# Patient Record
Sex: FEMALE | Race: WHITE | NOT HISPANIC OR LATINO | Employment: OTHER | URBAN - METROPOLITAN AREA
[De-identification: names, ages, dates, MRNs, and addresses within clinical notes are randomized per-mention and may not be internally consistent; named-entity substitution may affect disease eponyms.]

---

## 2020-02-13 ENCOUNTER — HOSPITAL ENCOUNTER (EMERGENCY)
Facility: HOSPITAL | Age: 85
Discharge: HOME | End: 2020-02-13
Attending: EMERGENCY MEDICINE | Admitting: EMERGENCY MEDICINE
Payer: MEDICARE

## 2020-02-13 ENCOUNTER — APPOINTMENT (EMERGENCY)
Dept: RADIOLOGY | Facility: HOSPITAL | Age: 85
End: 2020-02-13
Payer: MEDICARE

## 2020-02-13 VITALS
DIASTOLIC BLOOD PRESSURE: 70 MMHG | OXYGEN SATURATION: 95 % | WEIGHT: 155 LBS | TEMPERATURE: 97.8 F | RESPIRATION RATE: 19 BRPM | HEART RATE: 88 BPM | SYSTOLIC BLOOD PRESSURE: 122 MMHG

## 2020-02-13 DIAGNOSIS — S01.511A LIP LACERATION, INITIAL ENCOUNTER: ICD-10-CM
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
## 2020-02-13 DIAGNOSIS — S09.90XA INJURY OF HEAD, INITIAL ENCOUNTER: Primary | ICD-10-CM

## 2020-02-13 PROCEDURE — 99283 EMERGENCY DEPT VISIT LOW MDM: CPT

## 2020-02-13 PROCEDURE — 12011 RPR F/E/E/N/L/M 2.5 CM/<: CPT | Performed by: PHYSICIAN ASSISTANT

## 2020-02-13 PROCEDURE — 99283 EMERGENCY DEPT VISIT LOW MDM: CPT | Performed by: PHYSICIAN ASSISTANT

## 2020-02-13 PROCEDURE — 70450 CT HEAD/BRAIN W/O DYE: CPT

## 2020-02-13 RX ORDER — LIDOCAINE HYDROCHLORIDE 10 MG/ML
5 INJECTION, SOLUTION EPIDURAL; INFILTRATION; INTRACAUDAL; PERINEURAL ONCE
Status: COMPLETED | OUTPATIENT
Start: 2020-02-13 | End: 2020-02-13

## 2020-02-13 RX ADMIN — LIDOCAINE HYDROCHLORIDE 5 ML: 10 INJECTION, SOLUTION EPIDURAL; INFILTRATION; INTRACAUDAL; PERINEURAL at 13:59

## 2020-02-13 NOTE — ED PROVIDER NOTES
History  Chief Complaint   Patient presents with   [address] [place] [address] emergency; slip/trip -headstrike -loc -thinners   c/o lip pain     Patient is an 81-year-old female presenting to the emergency department for evaluation of fall head injury  Patient states prior to arrival she was visiting a friend in the hospital, when she slipped, causing her to fall onto her face  Patient sustained laceration to lip and abrasion to right cheek  No loss of consciousness  Patient denies currently taking blood thinners  Per daughter, patient acting normally  Patient denies vision changes, vomiting, dizziness, headache, gait abnormality, tooth injury, eye pain, neck, back pain  None       History reviewed  No pertinent past medical history  History reviewed  No pertinent surgical history  History reviewed  No pertinent family history  I have reviewed and agree with the history as documented  Social History     Tobacco Use    Smoking status: Never Smoker    Smokeless tobacco: Never Used   Substance Use Topics    Alcohol use: Not Currently    Drug use: Not Currently       Review of Systems   Constitutional: Negative for chills and fever  HENT: Negative for ear pain and sore throat  Eyes: Negative for redness  Respiratory: Negative for chest tightness and shortness of breath  Cardiovascular: Negative for chest pain, palpitations and leg swelling  Gastrointestinal: Negative for abdominal pain, diarrhea, nausea and vomiting  Genitourinary: Negative for dysuria and hematuria  Musculoskeletal: Negative for back pain and neck pain  Head injury   Skin: Positive for wound  Neurological: Negative for weakness and headaches  Psychiatric/Behavioral: Negative for confusion  Physical Exam  Physical Exam   Constitutional: She is oriented to person, place, and time  She appears well-developed and well-nourished  Non-toxic appearance  She does not have a sickly appearance   She does not appear ill  HENT:   Head: Normocephalic and atraumatic  Right Ear: External ear normal    Left Ear: External ear normal    Nose: Nose normal  No nose lacerations, sinus tenderness or nasal deformity  No epistaxis  Mouth/Throat: Uvula is midline and mucous membranes are normal  Lacerations present  1cm laceration noted, crossing Vermillion border  No dental injury, no oral lacerations   Eyes: Pupils are equal, round, and reactive to light  Conjunctivae and EOM are normal    Neck: Normal range of motion  Neck supple  No spinous process tenderness and no muscular tenderness present  No neck rigidity  No edema, no erythema and normal range of motion present  Cardiovascular: Normal rate and regular rhythm  Pulmonary/Chest: Effort normal and breath sounds normal    Musculoskeletal: Normal range of motion  Neurological: She is alert and oriented to person, place, and time  Skin: Skin is warm and dry  Capillary refill takes less than 2 seconds  Psychiatric: She has a normal mood and affect  Her behavior is normal        Vital Signs  ED Triage Vitals [02/13/20 1345]   Temperature Pulse Respirations Blood Pressure SpO2   97 8 °F (36 6 °C) 88 19 122/70 95 %      Temp Source Heart Rate Source Patient Position - Orthostatic VS BP Location FiO2 (%)   Oral Monitor -- -- --      Pain Score       7           Vitals:    02/13/20 1345   BP: 122/70   Pulse: 88         Visual Acuity      ED Medications  Medications   lidocaine (PF) (XYLOCAINE-MPF) 1 % injection 5 mL (5 mL Infiltration Given by Other 2/13/20 1351)       Diagnostic Studies  Results Reviewed     None                 CT head without contrast   Final Result by Loreto Deal MD (02/13 7223)      No acute intracranial abnormality  Mild microangiopathic change              Workstation performed: [id number]                    Procedures  Laceration repair  Date/Time: 2/13/2020 2:36 PM  Performed by: Tal Saucedo PA-C  Authorized by: Tatiana Taylor Nacho Christianson PA-C   Consent: Verbal consent obtained  Risks and benefits: risks, benefits and alternatives were discussed  Consent given by: patient  Patient identity confirmed: verbally with patient  Time out: Immediately prior to procedure a "time out" was called to verify the correct patient, procedure, equipment, support staff and site/side marked as required  Body area: head/neck  Location details: upper lip  Full thickness lip laceration: no  Vermilion border involved: yes  Lip laceration height: vermilion only  Laceration length: 1 cm  Foreign bodies: no foreign bodies  Tendon involvement: none  Nerve involvement: none  Vascular damage: no  Anesthesia: local infiltration    Anesthesia:  Local Anesthetic: lidocaine 1% without epinephrine  Anesthetic total: 1 mL      Procedure Details:  Preparation: Patient was prepped and draped in the usual sterile fashion  Irrigation solution: saline  Irrigation method: syringe  Amount of cleaning: standard  Debridement: none  Degree of undermining: none  Skin closure: 5-0 Prolene  Number of sutures: 4  Technique: simple  Approximation: close  Approximation difficulty: simple  Lip approximation: vermillion border well aligned  Patient tolerance: Patient tolerated the procedure well with no immediate complications               ED Course           Identification of Seniors at Risk      Most Recent Value   (ISAR) Identification of Seniors at Risk   Before the illness or injury that brought you to the Emergency, did you need someone to help you on a regular basis? 0 Filed at: 02/13/2020 1347   In the last 24 hours, have you needed more help than usual?  0 Filed at: 02/13/2020 1347   Have you been hospitalized for one or more nights during the past 6 months? 0 Filed at: 02/13/2020 1347   In general, do you see well?  0 Filed at: 02/13/2020 1347   In general, do you have serious problems with your memory?   0 Filed at: 02/13/2020 1347   Do you take more than three different medications every day? 1 Filed at: 02/13/2020 1347   ISAR Score  1 Filed at: 02/13/2020 1347                          MDM  Number of Diagnoses or Management Options  Fall, initial encounter: new and does not require workup  Injury of head, initial encounter: new and does not require workup  Lip laceration, initial encounter: new and does not require workup  Diagnosis management comments: Patient is an 69-year-old female presenting to the emergency department for evaluation of fall head injury  Patient states prior to arrival she was visiting a friend in the hospital, when she slipped, causing her to fall onto her face  Patient sustained laceration to lip and abrasion to right cheek  No loss of consciousness  Patient denies currently taking blood thinners  Per daughter, patient acting normally  Patient denies vision changes, vomiting, dizziness, headache, gait abnormality, tooth injury, eye pain, neck, back pain  CT head shows "No acute intracranial abnormality  Mild microangiopathic change " Explained these results to patient  Abrasion noted to right cheek, cleaned  1cm laceration noted to upper lip involving Vermillion border  Laceration cleaned thoroughly, 4 sutures placed without complication, Vermillion border well approximated  Advised pt f/u with PCP for re-evaluation of head injury and suture removal in 5-7 days  Pt very well appearing, non-toxic, no focal neurological deficits and pt without any complaints at time of discharge  Pt verbalizes understanding and agrees with plan  The management plan was discussed in detail with the patient at bedside and all questions were answered  Prior to discharge, I provided both verbal and written instructions  I discussed with the patient the signs and symptoms for which to return to the emergency department  All questions were answered and patient was comfortable with the plan of care and discharged to home   The patient verbalized understanding of our discussion and plan of care, and agrees to return to the Emergency Department for concerns and progression of illness  Disposition  Final diagnoses:   Injury of head, initial encounter   Lip laceration, initial encounter   Fall, initial encounter     Time reflects when diagnosis was documented in both MDM as applicable and the Disposition within this note     Time User Action Codes Description Comment    2/13/2020  2:51 PM Corita Rein Add [S09 90XA] Injury of head, initial encounter     2/13/2020  2:51 PM Corita Rein Add [S01 511A] Lip laceration, initial encounter     2/13/2020  2:51 PM Corita Rein Add Josh Hernandez Erp, initial encounter       ED Disposition     ED Disposition Condition Date/Time Comment    Discharge Stable Thu Feb 13, 2020  2:51 PM Jefry Mcclure discharge to home/self care  Follow-up Information     Follow up With Specialties Details Why Contact Info    Shailesh Varela MD Family Medicine Go to  For suture removal in 5-7 days [address] [place] [address]  [phone]            Patient's Medications    No medications on file     No discharge procedures on file      PDMP Review     None          ED Provider  Electronically Signed by           Lito Espino PA-C  02/13/20 6694

## 2020-10-02 ENCOUNTER — HOSPITAL ENCOUNTER (OUTPATIENT)
Dept: RADIOLOGY | Facility: HOSPITAL | Age: 85
Discharge: HOME | End: 2020-10-02
Attending: FAMILY MEDICINE
Payer: COMMERCIAL

## 2020-10-02 ENCOUNTER — TRANSCRIBE ORDERS (OUTPATIENT)
Dept: ADMINISTRATIVE | Facility: HOSPITAL | Age: 85
End: 2020-10-02

## 2020-10-02 ENCOUNTER — APPOINTMENT (OUTPATIENT)
Dept: LAB | Facility: HOSPITAL | Age: 85
End: 2020-10-02
Attending: FAMILY MEDICINE
Payer: COMMERCIAL

## 2020-10-02 DIAGNOSIS — M25.571 RIGHT ANKLE PAIN, UNSPECIFIED CHRONICITY: ICD-10-CM

## 2020-10-02 DIAGNOSIS — M81.0 SENILE OSTEOPOROSIS: ICD-10-CM

## 2020-10-02 DIAGNOSIS — F03.90 SENILE DEMENTIA, UNCOMPLICATED (HCC): ICD-10-CM

## 2020-10-02 DIAGNOSIS — F03.90 SENILE DEMENTIA, UNCOMPLICATED (HCC): Primary | ICD-10-CM

## 2020-10-02 LAB
25(OH)D3 SERPL-MCNC: 24.9 NG/ML (ref 30–100)
ALBUMIN SERPL BCP-MCNC: 4.1 G/DL (ref 3.4–4.8)
ALP SERPL-CCNC: 55.5 U/L (ref 35–140)
ALT SERPL W P-5'-P-CCNC: 15 U/L (ref 5–54)
ANION GAP SERPL CALCULATED.3IONS-SCNC: 8 MMOL/L (ref 4–13)
AST SERPL W P-5'-P-CCNC: 21 U/L (ref 15–41)
BASOPHILS # BLD AUTO: 0.1 THOUSANDS/ΜL (ref 0–0.1)
BASOPHILS NFR BLD AUTO: 2 % (ref 0–1)
BILIRUB SERPL-MCNC: 0.86 MG/DL (ref 0.3–1.2)
BUN SERPL-MCNC: 23 MG/DL (ref 6–20)
CALCIUM SERPL-MCNC: 9.1 MG/DL (ref 8.4–10.2)
CHLORIDE SERPL-SCNC: 110 MMOL/L (ref 96–108)
CHOLEST SERPL-MCNC: 118 MG/DL
CO2 SERPL-SCNC: 26 MMOL/L (ref 22–33)
CREAT SERPL-MCNC: 0.58 MG/DL (ref 0.4–1.1)
EOSINOPHIL # BLD AUTO: 0.25 THOUSAND/ΜL (ref 0–0.61)
EOSINOPHIL NFR BLD AUTO: 4 % (ref 0–6)
ERYTHROCYTE [DISTWIDTH] IN BLOOD BY AUTOMATED COUNT: 13.7 % (ref 11.6–15.1)
GFR SERPL CREATININE-BSD FRML MDRD: 85 ML/MIN/1.73SQ M
GLUCOSE P FAST SERPL-MCNC: 105 MG/DL (ref 70–100)
HCT VFR BLD AUTO: 41.2 % (ref 34.8–46.1)
HDLC SERPL-MCNC: 38 MG/DL
HGB BLD-MCNC: 13.9 G/DL (ref 11.5–15.4)
IMM GRANULOCYTES # BLD AUTO: 0.02 THOUSAND/UL (ref 0–0.2)
IMM GRANULOCYTES NFR BLD AUTO: 0 % (ref 0–2)
LDLC SERPL CALC-MCNC: 64 MG/DL (ref 0–100)
LYMPHOCYTES # BLD AUTO: 1.27 THOUSANDS/ΜL (ref 0.6–4.47)
LYMPHOCYTES NFR BLD AUTO: 20 % (ref 14–44)
MCH RBC QN AUTO: 31.4 PG (ref 26.8–34.3)
MCHC RBC AUTO-ENTMCNC: 33.7 G/DL (ref 31.4–37.4)
MCV RBC AUTO: 93 FL (ref 82–98)
MONOCYTES # BLD AUTO: 0.51 THOUSAND/ΜL (ref 0.17–1.22)
MONOCYTES NFR BLD AUTO: 8 % (ref 4–12)
NEUTROPHILS # BLD AUTO: 4.07 THOUSANDS/ΜL (ref 1.85–7.62)
NEUTS SEG NFR BLD AUTO: 66 % (ref 43–75)
NONHDLC SERPL-MCNC: 80 MG/DL
PLATELET # BLD AUTO: 199 THOUSANDS/UL (ref 149–390)
PMV BLD AUTO: 12.7 FL (ref 8.9–12.7)
POTASSIUM SERPL-SCNC: 3.9 MMOL/L (ref 3.5–5)
PROT SERPL-MCNC: 7 G/DL (ref 6.4–8.3)
RBC # BLD AUTO: 4.43 MILLION/UL (ref 3.81–5.12)
SODIUM SERPL-SCNC: 144 MMOL/L (ref 133–145)
TRIGL SERPL-MCNC: 80.2 MG/DL
TSH SERPL DL<=0.05 MIU/L-ACNC: 4.88 UIU/ML (ref 0.34–5.6)
WBC # BLD AUTO: 6.22 THOUSAND/UL (ref 4.31–10.16)

## 2020-10-02 PROCEDURE — 80061 LIPID PANEL: CPT

## 2020-10-02 PROCEDURE — 36415 COLL VENOUS BLD VENIPUNCTURE: CPT

## 2020-10-02 PROCEDURE — 80053 COMPREHEN METABOLIC PANEL: CPT

## 2020-10-02 PROCEDURE — 82306 VITAMIN D 25 HYDROXY: CPT

## 2020-10-02 PROCEDURE — 85025 COMPLETE CBC W/AUTO DIFF WBC: CPT

## 2020-10-02 PROCEDURE — 84443 ASSAY THYROID STIM HORMONE: CPT

## 2020-10-02 PROCEDURE — 73610 X-RAY EXAM OF ANKLE: CPT

## 2021-03-02 DIAGNOSIS — Z23 ENCOUNTER FOR IMMUNIZATION: ICD-10-CM

## 2021-03-10 ENCOUNTER — IMMUNIZATIONS (OUTPATIENT)
Dept: FAMILY MEDICINE CLINIC | Facility: HOSPITAL | Age: 86
End: 2021-03-10

## 2021-03-10 DIAGNOSIS — Z23 ENCOUNTER FOR IMMUNIZATION: Primary | ICD-10-CM

## 2021-03-10 PROCEDURE — 0001A SARS-COV-2 / COVID-19 MRNA VACCINE (PFIZER-BIONTECH) 30 MCG: CPT

## 2021-03-10 PROCEDURE — 91300 SARS-COV-2 / COVID-19 MRNA VACCINE (PFIZER-BIONTECH) 30 MCG: CPT

## 2021-03-25 ENCOUNTER — APPOINTMENT (OUTPATIENT)
Dept: LAB | Facility: HOSPITAL | Age: 86
End: 2021-03-25
Attending: FAMILY MEDICINE
Payer: MEDICARE

## 2021-03-25 ENCOUNTER — TRANSCRIBE ORDERS (OUTPATIENT)
Dept: ADMINISTRATIVE | Facility: HOSPITAL | Age: 86
End: 2021-03-25

## 2021-03-25 DIAGNOSIS — R73.01 IMPAIRED FASTING GLUCOSE: Primary | ICD-10-CM

## 2021-03-25 DIAGNOSIS — E55.9 VITAMIN D DEFICIENCY: ICD-10-CM

## 2021-03-25 DIAGNOSIS — R73.01 IMPAIRED FASTING GLUCOSE: ICD-10-CM

## 2021-03-25 DIAGNOSIS — M81.0 OSTEOPOROSIS, POST-MENOPAUSAL: Primary | ICD-10-CM

## 2021-03-25 LAB
25(OH)D3 SERPL-MCNC: 22.3 NG/ML (ref 30–100)
ALBUMIN SERPL BCP-MCNC: 4.2 G/DL (ref 3.4–4.8)
ALP SERPL-CCNC: 63 U/L (ref 35–140)
ALT SERPL W P-5'-P-CCNC: 17 U/L (ref 5–54)
ANION GAP SERPL CALCULATED.3IONS-SCNC: 9 MMOL/L (ref 4–13)
AST SERPL W P-5'-P-CCNC: 25 U/L (ref 15–41)
BASOPHILS # BLD AUTO: 0.07 THOUSANDS/ΜL (ref 0–0.1)
BASOPHILS NFR BLD AUTO: 1 % (ref 0–1)
BILIRUB SERPL-MCNC: 0.86 MG/DL (ref 0.3–1.2)
BUN SERPL-MCNC: 16 MG/DL (ref 6–20)
CALCIUM SERPL-MCNC: 9 MG/DL (ref 8.4–10.2)
CHLORIDE SERPL-SCNC: 108 MMOL/L (ref 96–108)
CO2 SERPL-SCNC: 24 MMOL/L (ref 22–33)
CREAT SERPL-MCNC: 0.67 MG/DL (ref 0.4–1.1)
EOSINOPHIL # BLD AUTO: 0.15 THOUSAND/ΜL (ref 0–0.61)
EOSINOPHIL NFR BLD AUTO: 2 % (ref 0–6)
ERYTHROCYTE [DISTWIDTH] IN BLOOD BY AUTOMATED COUNT: 13.8 % (ref 11.6–15.1)
EST. AVERAGE GLUCOSE BLD GHB EST-MCNC: 111 MG/DL
GFR SERPL CREATININE-BSD FRML MDRD: 81 ML/MIN/1.73SQ M
GLUCOSE P FAST SERPL-MCNC: 114 MG/DL (ref 70–105)
HBA1C MFR BLD: 5.5 %
HCT VFR BLD AUTO: 43.1 % (ref 34.8–46.1)
HGB BLD-MCNC: 14.4 G/DL (ref 11.5–15.4)
IMM GRANULOCYTES # BLD AUTO: 0.02 THOUSAND/UL (ref 0–0.2)
IMM GRANULOCYTES NFR BLD AUTO: 0 % (ref 0–2)
LYMPHOCYTES # BLD AUTO: 1.2 THOUSANDS/ΜL (ref 0.6–4.47)
LYMPHOCYTES NFR BLD AUTO: 18 % (ref 14–44)
MCH RBC QN AUTO: 30.9 PG (ref 26.8–34.3)
MCHC RBC AUTO-ENTMCNC: 33.4 G/DL (ref 31.4–37.4)
MCV RBC AUTO: 93 FL (ref 82–98)
MONOCYTES # BLD AUTO: 0.73 THOUSAND/ΜL (ref 0.17–1.22)
MONOCYTES NFR BLD AUTO: 11 % (ref 4–12)
NEUTROPHILS # BLD AUTO: 4.42 THOUSANDS/ΜL (ref 1.85–7.62)
NEUTS SEG NFR BLD AUTO: 68 % (ref 43–75)
PLATELET # BLD AUTO: 170 THOUSANDS/UL (ref 149–390)
PMV BLD AUTO: 12.1 FL (ref 8.9–12.7)
POTASSIUM SERPL-SCNC: 4 MMOL/L (ref 3.5–5)
PROT SERPL-MCNC: 7 G/DL (ref 6.4–8.3)
RBC # BLD AUTO: 4.66 MILLION/UL (ref 3.81–5.12)
SODIUM SERPL-SCNC: 141 MMOL/L (ref 133–145)
WBC # BLD AUTO: 6.59 THOUSAND/UL (ref 4.31–10.16)

## 2021-03-25 PROCEDURE — 82306 VITAMIN D 25 HYDROXY: CPT

## 2021-03-25 PROCEDURE — 36415 COLL VENOUS BLD VENIPUNCTURE: CPT

## 2021-03-25 PROCEDURE — 80053 COMPREHEN METABOLIC PANEL: CPT

## 2021-03-25 PROCEDURE — 85025 COMPLETE CBC W/AUTO DIFF WBC: CPT

## 2021-03-25 PROCEDURE — 83036 HEMOGLOBIN GLYCOSYLATED A1C: CPT

## 2021-03-29 ENCOUNTER — HOSPITAL ENCOUNTER (OUTPATIENT)
Dept: RADIOLOGY | Facility: HOSPITAL | Age: 86
Discharge: HOME | End: 2021-03-29
Payer: MEDICARE

## 2021-03-29 DIAGNOSIS — M81.0 OSTEOPOROSIS, POST-MENOPAUSAL: ICD-10-CM

## 2021-03-29 PROCEDURE — 77080 DXA BONE DENSITY AXIAL: CPT

## 2021-04-11 ENCOUNTER — IMMUNIZATIONS (OUTPATIENT)
Dept: FAMILY MEDICINE CLINIC | Facility: HOSPITAL | Age: 86
End: 2021-04-11

## 2021-04-11 DIAGNOSIS — Z23 ENCOUNTER FOR IMMUNIZATION: Primary | ICD-10-CM

## 2021-04-11 PROCEDURE — 0002A SARS-COV-2 / COVID-19 MRNA VACCINE (PFIZER-BIONTECH) 30 MCG: CPT

## 2021-04-11 PROCEDURE — 91300 SARS-COV-2 / COVID-19 MRNA VACCINE (PFIZER-BIONTECH) 30 MCG: CPT

## 2021-11-16 ENCOUNTER — IMMUNIZATIONS (OUTPATIENT)
Dept: FAMILY MEDICINE CLINIC | Facility: HOSPITAL | Age: 86
End: 2021-11-16

## 2021-11-16 DIAGNOSIS — Z23 ENCOUNTER FOR IMMUNIZATION: Primary | ICD-10-CM

## 2021-11-16 PROCEDURE — 91300 COVID-19 PFIZER VACC 0.3 ML: CPT

## 2021-11-16 PROCEDURE — 0001A COVID-19 PFIZER VACC 0.3 ML: CPT

## 2022-03-28 ENCOUNTER — APPOINTMENT (OUTPATIENT)
Dept: LAB | Facility: HOSPITAL | Age: 87
End: 2022-03-28
Attending: FAMILY MEDICINE
Payer: COMMERCIAL

## 2022-03-28 DIAGNOSIS — E55.9 VITAMIN D DEFICIENCY: ICD-10-CM

## 2022-03-28 DIAGNOSIS — R73.01 IMPAIRED FASTING GLUCOSE: ICD-10-CM

## 2022-03-28 LAB
25(OH)D3 SERPL-MCNC: 29.9 NG/ML (ref 30–100)
ALBUMIN SERPL BCP-MCNC: 4.5 G/DL (ref 3.5–5)
ALP SERPL-CCNC: 65 U/L (ref 34–104)
ALT SERPL W P-5'-P-CCNC: 19 U/L (ref 7–52)
ANION GAP SERPL CALCULATED.3IONS-SCNC: 9 MMOL/L (ref 4–13)
AST SERPL W P-5'-P-CCNC: 25 U/L (ref 13–39)
BASOPHILS # BLD AUTO: 0.09 THOUSANDS/ΜL (ref 0–0.1)
BASOPHILS NFR BLD AUTO: 1 % (ref 0–1)
BILIRUB SERPL-MCNC: 0.9 MG/DL (ref 0.2–1)
BUN SERPL-MCNC: 21 MG/DL (ref 5–25)
CALCIUM SERPL-MCNC: 9.5 MG/DL (ref 8.4–10.2)
CHLORIDE SERPL-SCNC: 105 MMOL/L (ref 96–108)
CHOLEST SERPL-MCNC: 152 MG/DL
CO2 SERPL-SCNC: 25 MMOL/L (ref 21–32)
CREAT SERPL-MCNC: 0.71 MG/DL (ref 0.6–1.3)
EOSINOPHIL # BLD AUTO: 0.11 THOUSAND/ΜL (ref 0–0.61)
EOSINOPHIL NFR BLD AUTO: 1 % (ref 0–6)
ERYTHROCYTE [DISTWIDTH] IN BLOOD BY AUTOMATED COUNT: 13.4 % (ref 11.6–15.1)
EST. AVERAGE GLUCOSE BLD GHB EST-MCNC: 114 MG/DL
GFR SERPL CREATININE-BSD FRML MDRD: 77 ML/MIN/1.73SQ M
GLUCOSE P FAST SERPL-MCNC: 108 MG/DL (ref 65–99)
HBA1C MFR BLD: 5.6 %
HCT VFR BLD AUTO: 44.4 % (ref 34.8–46.1)
HDLC SERPL-MCNC: 50 MG/DL
HGB BLD-MCNC: 14.8 G/DL (ref 11.5–15.4)
IMM GRANULOCYTES # BLD AUTO: 0.05 THOUSAND/UL (ref 0–0.2)
IMM GRANULOCYTES NFR BLD AUTO: 1 % (ref 0–2)
LDLC SERPL CALC-MCNC: 81 MG/DL (ref 0–100)
LYMPHOCYTES # BLD AUTO: 1.55 THOUSANDS/ΜL (ref 0.6–4.47)
LYMPHOCYTES NFR BLD AUTO: 19 % (ref 14–44)
MCH RBC QN AUTO: 30.9 PG (ref 26.8–34.3)
MCHC RBC AUTO-ENTMCNC: 33.3 G/DL (ref 31.4–37.4)
MCV RBC AUTO: 93 FL (ref 82–98)
MONOCYTES # BLD AUTO: 0.67 THOUSAND/ΜL (ref 0.17–1.22)
MONOCYTES NFR BLD AUTO: 8 % (ref 4–12)
NEUTROPHILS # BLD AUTO: 5.52 THOUSANDS/ΜL (ref 1.85–7.62)
NEUTS SEG NFR BLD AUTO: 70 % (ref 43–75)
NONHDLC SERPL-MCNC: 102 MG/DL
NRBC BLD AUTO-RTO: 0 /100 WBCS
PLATELET # BLD AUTO: 199 THOUSANDS/UL (ref 149–390)
PMV BLD AUTO: 12.4 FL (ref 8.9–12.7)
POTASSIUM SERPL-SCNC: 4.2 MMOL/L (ref 3.5–5.3)
PROT SERPL-MCNC: 7.5 G/DL (ref 6.4–8.4)
RBC # BLD AUTO: 4.79 MILLION/UL (ref 3.81–5.12)
SODIUM SERPL-SCNC: 139 MMOL/L (ref 135–147)
TRIGL SERPL-MCNC: 106 MG/DL
WBC # BLD AUTO: 7.99 THOUSAND/UL (ref 4.31–10.16)

## 2022-03-28 PROCEDURE — 80053 COMPREHEN METABOLIC PANEL: CPT

## 2022-03-28 PROCEDURE — 80061 LIPID PANEL: CPT

## 2022-03-28 PROCEDURE — 36415 COLL VENOUS BLD VENIPUNCTURE: CPT

## 2022-03-28 PROCEDURE — 82306 VITAMIN D 25 HYDROXY: CPT

## 2022-03-28 PROCEDURE — 83036 HEMOGLOBIN GLYCOSYLATED A1C: CPT

## 2022-03-28 PROCEDURE — 85025 COMPLETE CBC W/AUTO DIFF WBC: CPT

## 2022-10-05 ENCOUNTER — APPOINTMENT (OUTPATIENT)
Dept: LAB | Facility: HOSPITAL | Age: 87
End: 2022-10-05
Attending: FAMILY MEDICINE
Payer: MEDICARE

## 2022-10-05 DIAGNOSIS — R73.01 IMPAIRED FASTING GLUCOSE: ICD-10-CM

## 2022-10-05 DIAGNOSIS — E55.9 AVITAMINOSIS D: ICD-10-CM

## 2022-10-05 DIAGNOSIS — E03.9 ACQUIRED HYPOTHYROIDISM: Primary | ICD-10-CM

## 2022-10-05 LAB
25(OH)D3 SERPL-MCNC: 59.5 NG/ML (ref 30–100)
ALBUMIN SERPL BCP-MCNC: 4.5 G/DL (ref 3.5–5)
ALP SERPL-CCNC: 79 U/L (ref 34–104)
ALT SERPL W P-5'-P-CCNC: 19 U/L (ref 7–52)
ANION GAP SERPL CALCULATED.3IONS-SCNC: 12 MMOL/L (ref 4–13)
AST SERPL W P-5'-P-CCNC: 28 U/L (ref 13–39)
BASOPHILS # BLD AUTO: 0.09 THOUSANDS/ΜL (ref 0–0.1)
BASOPHILS NFR BLD AUTO: 1 % (ref 0–1)
BILIRUB SERPL-MCNC: 0.78 MG/DL (ref 0.2–1)
BUN SERPL-MCNC: 18 MG/DL (ref 5–25)
CALCIUM SERPL-MCNC: 9.5 MG/DL (ref 8.4–10.2)
CHLORIDE SERPL-SCNC: 107 MMOL/L (ref 96–108)
CHOLEST SERPL-MCNC: 152 MG/DL
CO2 SERPL-SCNC: 23 MMOL/L (ref 21–32)
CREAT SERPL-MCNC: 0.64 MG/DL (ref 0.6–1.3)
EOSINOPHIL # BLD AUTO: 0.1 THOUSAND/ΜL (ref 0–0.61)
EOSINOPHIL NFR BLD AUTO: 1 % (ref 0–6)
ERYTHROCYTE [DISTWIDTH] IN BLOOD BY AUTOMATED COUNT: 13.6 % (ref 11.6–15.1)
GFR SERPL CREATININE-BSD FRML MDRD: 81 ML/MIN/1.73SQ M
GLUCOSE P FAST SERPL-MCNC: 110 MG/DL (ref 65–99)
HCT VFR BLD AUTO: 44.1 % (ref 34.8–46.1)
HDLC SERPL-MCNC: 45 MG/DL
HGB BLD-MCNC: 14.8 G/DL (ref 11.5–15.4)
IMM GRANULOCYTES # BLD AUTO: 0.05 THOUSAND/UL (ref 0–0.2)
IMM GRANULOCYTES NFR BLD AUTO: 1 % (ref 0–2)
LDLC SERPL CALC-MCNC: 77 MG/DL (ref 0–100)
LYMPHOCYTES # BLD AUTO: 1.57 THOUSANDS/ΜL (ref 0.6–4.47)
LYMPHOCYTES NFR BLD AUTO: 18 % (ref 14–44)
MCH RBC QN AUTO: 31.4 PG (ref 26.8–34.3)
MCHC RBC AUTO-ENTMCNC: 33.6 G/DL (ref 31.4–37.4)
MCV RBC AUTO: 93 FL (ref 82–98)
MONOCYTES # BLD AUTO: 0.66 THOUSAND/ΜL (ref 0.17–1.22)
MONOCYTES NFR BLD AUTO: 8 % (ref 4–12)
NEUTROPHILS # BLD AUTO: 6.35 THOUSANDS/ΜL (ref 1.85–7.62)
NEUTS SEG NFR BLD AUTO: 71 % (ref 43–75)
NONHDLC SERPL-MCNC: 107 MG/DL
NRBC BLD AUTO-RTO: 0 /100 WBCS
PLATELET # BLD AUTO: 216 THOUSANDS/UL (ref 149–390)
PMV BLD AUTO: 12.5 FL (ref 8.9–12.7)
POTASSIUM SERPL-SCNC: 4.1 MMOL/L (ref 3.5–5.3)
PROT SERPL-MCNC: 7.6 G/DL (ref 6.4–8.4)
RBC # BLD AUTO: 4.72 MILLION/UL (ref 3.81–5.12)
SODIUM SERPL-SCNC: 142 MMOL/L (ref 135–147)
TRIGL SERPL-MCNC: 148 MG/DL
TSH SERPL DL<=0.05 MIU/L-ACNC: 5.46 UIU/ML (ref 0.45–4.5)
WBC # BLD AUTO: 8.82 THOUSAND/UL (ref 4.31–10.16)

## 2022-10-05 PROCEDURE — 83036 HEMOGLOBIN GLYCOSYLATED A1C: CPT

## 2022-10-05 PROCEDURE — 80053 COMPREHEN METABOLIC PANEL: CPT

## 2022-10-05 PROCEDURE — 82306 VITAMIN D 25 HYDROXY: CPT

## 2022-10-05 PROCEDURE — 84443 ASSAY THYROID STIM HORMONE: CPT

## 2022-10-05 PROCEDURE — 36415 COLL VENOUS BLD VENIPUNCTURE: CPT

## 2022-10-05 PROCEDURE — 85025 COMPLETE CBC W/AUTO DIFF WBC: CPT

## 2022-10-05 PROCEDURE — 84439 ASSAY OF FREE THYROXINE: CPT

## 2022-10-05 PROCEDURE — 80061 LIPID PANEL: CPT

## 2022-10-06 LAB
EST. AVERAGE GLUCOSE BLD GHB EST-MCNC: 117 MG/DL
HBA1C MFR BLD: 5.7 %
T4 FREE SERPL-MCNC: 0.8 NG/DL (ref 0.76–1.46)

## 2024-01-31 ENCOUNTER — APPOINTMENT (EMERGENCY)
Dept: RADIOLOGY | Facility: HOSPITAL | Age: 89
DRG: 481 | End: 2024-01-31
Payer: MEDICARE

## 2024-01-31 ENCOUNTER — APPOINTMENT (EMERGENCY)
Dept: CT IMAGING | Facility: HOSPITAL | Age: 89
DRG: 481 | End: 2024-01-31
Payer: MEDICARE

## 2024-01-31 ENCOUNTER — HOSPITAL ENCOUNTER (INPATIENT)
Facility: HOSPITAL | Age: 89
LOS: 4 days | Discharge: NURSING, REHAB OR LONG-TERM CARE | DRG: 481 | End: 2024-02-04
Attending: EMERGENCY MEDICINE | Admitting: SURGERY
Payer: MEDICARE

## 2024-01-31 DIAGNOSIS — F03.90 DEMENTIA (HCC): ICD-10-CM

## 2024-01-31 DIAGNOSIS — M97.01XA PERIPROSTHETIC FRACTURE AROUND INTERNAL PROSTHETIC RIGHT HIP JOINT, INITIAL ENCOUNTER (HCC): Primary | ICD-10-CM

## 2024-01-31 DIAGNOSIS — M19.90 DEGENERATIVE ARTHRITIS: ICD-10-CM
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
## 2024-01-31 PROBLEM — G89.11 ACUTE PAIN DUE TO TRAUMA: Status: ACTIVE | Noted: 2024-01-31

## 2024-01-31 PROBLEM — Z96.649 PERIPROSTHETIC FRACTURE AROUND INTERNAL PROSTHETIC HIP JOINT: Status: ACTIVE | Noted: 2024-01-31

## 2024-01-31 PROBLEM — Z86.39 HISTORY OF HYPOTHYROIDISM: Status: ACTIVE | Noted: 2024-01-31

## 2024-01-31 PROBLEM — M97.8XXA PERIPROSTHETIC FRACTURE AROUND INTERNAL PROSTHETIC HIP JOINT: Status: ACTIVE | Noted: 2024-01-31

## 2024-01-31 PROBLEM — Z86.59 HISTORY OF DEMENTIA: Status: ACTIVE | Noted: 2024-01-31

## 2024-01-31 LAB
ABO GROUP BLD: NORMAL
ABO GROUP BLD: NORMAL
AMORPH URATE CRY URNS QL MICRO: ABNORMAL
ANION GAP SERPL CALCULATED.3IONS-SCNC: 8 MMOL/L
BACTERIA UR QL AUTO: ABNORMAL /HPF
BASOPHILS # BLD AUTO: 0.08 THOUSANDS/ÂΜL (ref 0–0.1)
BASOPHILS NFR BLD AUTO: 1 % (ref 0–1)
BILIRUB UR QL STRIP: NEGATIVE
BLD GP AB SCN SERPL QL: NEGATIVE
BUN SERPL-MCNC: 13 MG/DL (ref 5–25)
CALCIUM SERPL-MCNC: 9 MG/DL (ref 8.4–10.2)
CHLORIDE SERPL-SCNC: 106 MMOL/L (ref 96–108)
CLARITY UR: ABNORMAL
CO2 SERPL-SCNC: 25 MMOL/L (ref 21–32)
COLOR UR: YELLOW
CREAT SERPL-MCNC: 0.59 MG/DL (ref 0.6–1.3)
EOSINOPHIL # BLD AUTO: 0.07 THOUSAND/ÂΜL (ref 0–0.61)
EOSINOPHIL NFR BLD AUTO: 1 % (ref 0–6)
ERYTHROCYTE [DISTWIDTH] IN BLOOD BY AUTOMATED COUNT: 13.9 % (ref 11.6–15.1)
GFR SERPL CREATININE-BSD FRML MDRD: 82 ML/MIN/1.73SQ M
GLUCOSE SERPL-MCNC: 109 MG/DL (ref 65–140)
GLUCOSE UR STRIP-MCNC: NEGATIVE MG/DL
HCT VFR BLD AUTO: 37.7 % (ref 34.8–46.1)
HGB BLD-MCNC: 12.4 G/DL (ref 11.5–15.4)
HGB UR QL STRIP.AUTO: NEGATIVE
IMM GRANULOCYTES # BLD AUTO: 0.04 THOUSAND/UL (ref 0–0.2)
IMM GRANULOCYTES NFR BLD AUTO: 0 % (ref 0–2)
INR PPP: 1.09 (ref 0.84–1.19)
KETONES UR STRIP-MCNC: NEGATIVE MG/DL
LEUKOCYTE ESTERASE UR QL STRIP: ABNORMAL
LYMPHOCYTES # BLD AUTO: 1.35 THOUSANDS/ÂΜL (ref 0.6–4.47)
LYMPHOCYTES NFR BLD AUTO: 13 % (ref 14–44)
MAGNESIUM SERPL-MCNC: 2.2 MG/DL (ref 1.9–2.7)
MCH RBC QN AUTO: 30.9 PG (ref 26.8–34.3)
MCHC RBC AUTO-ENTMCNC: 32.9 G/DL (ref 31.4–37.4)
MCV RBC AUTO: 94 FL (ref 82–98)
MONOCYTES # BLD AUTO: 1.07 THOUSAND/ÂΜL (ref 0.17–1.22)
MONOCYTES NFR BLD AUTO: 10 % (ref 4–12)
NEUTROPHILS # BLD AUTO: 7.95 THOUSANDS/ÂΜL (ref 1.85–7.62)
NEUTS SEG NFR BLD AUTO: 75 % (ref 43–75)
NITRITE UR QL STRIP: POSITIVE
NON-SQ EPI CELLS URNS QL MICRO: ABNORMAL /HPF
NRBC BLD AUTO-RTO: 0 /100 WBCS
PH UR STRIP.AUTO: 6.5 [PH]
PHOSPHATE SERPL-MCNC: 4 MG/DL (ref 2.3–4.1)
PLATELET # BLD AUTO: 172 THOUSANDS/UL (ref 149–390)
PMV BLD AUTO: 11.6 FL (ref 8.9–12.7)
POTASSIUM SERPL-SCNC: 3.9 MMOL/L (ref 3.5–5.3)
PROT UR STRIP-MCNC: NEGATIVE MG/DL
PROTHROMBIN TIME: 14.8 SECONDS (ref 11.6–14.5)
RBC # BLD AUTO: 4.01 MILLION/UL (ref 3.81–5.12)
RBC #/AREA URNS AUTO: ABNORMAL /HPF
RH BLD: POSITIVE
RH BLD: POSITIVE
SODIUM SERPL-SCNC: 139 MMOL/L (ref 135–147)
SP GR UR STRIP.AUTO: 1.01 (ref 1–1.03)
SPECIMEN EXPIRATION DATE: NORMAL
UROBILINOGEN UR STRIP-ACNC: <2 MG/DL
WBC # BLD AUTO: 10.56 THOUSAND/UL (ref 4.31–10.16)
WBC #/AREA URNS AUTO: ABNORMAL /HPF

## 2024-01-31 PROCEDURE — 86901 BLOOD TYPING SEROLOGIC RH(D): CPT | Performed by: PHYSICIAN ASSISTANT

## 2024-01-31 PROCEDURE — 72125 CT NECK SPINE W/O DYE: CPT

## 2024-01-31 PROCEDURE — 72170 X-RAY EXAM OF PELVIS: CPT

## 2024-01-31 PROCEDURE — 99223 1ST HOSP IP/OBS HIGH 75: CPT | Performed by: STUDENT IN AN ORGANIZED HEALTH CARE EDUCATION/TRAINING PROGRAM

## 2024-01-31 PROCEDURE — 99285 EMERGENCY DEPT VISIT HI MDM: CPT | Performed by: PHYSICIAN ASSISTANT

## 2024-01-31 PROCEDURE — 86850 RBC ANTIBODY SCREEN: CPT | Performed by: PHYSICIAN ASSISTANT

## 2024-01-31 PROCEDURE — 73552 X-RAY EXAM OF FEMUR 2/>: CPT

## 2024-01-31 PROCEDURE — 85610 PROTHROMBIN TIME: CPT | Performed by: PHYSICIAN ASSISTANT

## 2024-01-31 PROCEDURE — 99223 1ST HOSP IP/OBS HIGH 75: CPT | Performed by: SURGERY

## 2024-01-31 PROCEDURE — 81001 URINALYSIS AUTO W/SCOPE: CPT | Performed by: PHYSICIAN ASSISTANT

## 2024-01-31 PROCEDURE — 70450 CT HEAD/BRAIN W/O DYE: CPT

## 2024-01-31 PROCEDURE — 80048 BASIC METABOLIC PNL TOTAL CA: CPT | Performed by: PHYSICIAN ASSISTANT

## 2024-01-31 PROCEDURE — 83735 ASSAY OF MAGNESIUM: CPT | Performed by: PHYSICIAN ASSISTANT

## 2024-01-31 PROCEDURE — 86900 BLOOD TYPING SEROLOGIC ABO: CPT | Performed by: PHYSICIAN ASSISTANT

## 2024-01-31 PROCEDURE — 73700 CT LOWER EXTREMITY W/O DYE: CPT

## 2024-01-31 PROCEDURE — 84100 ASSAY OF PHOSPHORUS: CPT | Performed by: PHYSICIAN ASSISTANT

## 2024-01-31 PROCEDURE — 85025 COMPLETE CBC W/AUTO DIFF WBC: CPT | Performed by: PHYSICIAN ASSISTANT

## 2024-01-31 PROCEDURE — 99285 EMERGENCY DEPT VISIT HI MDM: CPT

## 2024-01-31 PROCEDURE — 36415 COLL VENOUS BLD VENIPUNCTURE: CPT | Performed by: PHYSICIAN ASSISTANT

## 2024-01-31 PROCEDURE — G1004 CDSM NDSC: HCPCS

## 2024-01-31 PROCEDURE — 71045 X-RAY EXAM CHEST 1 VIEW: CPT

## 2024-01-31 RX ORDER — POLYETHYLENE GLYCOL 3350 17 G/17G
17 POWDER, FOR SOLUTION ORAL DAILY
Status: DISCONTINUED | OUTPATIENT
Start: 2024-01-31 | End: 2024-02-04 | Stop reason: HOSPADM

## 2024-01-31 RX ORDER — ENOXAPARIN SODIUM 100 MG/ML
30 INJECTION SUBCUTANEOUS EVERY 12 HOURS SCHEDULED
Status: DISCONTINUED | OUTPATIENT
Start: 2024-01-31 | End: 2024-02-04 | Stop reason: HOSPADM

## 2024-01-31 RX ORDER — OXYCODONE HYDROCHLORIDE 5 MG/1
5 TABLET ORAL EVERY 4 HOURS PRN
Status: DISCONTINUED | OUTPATIENT
Start: 2024-01-31 | End: 2024-02-04 | Stop reason: HOSPADM

## 2024-01-31 RX ORDER — HYDROMORPHONE HCL IN WATER/PF 6 MG/30 ML
0.2 PATIENT CONTROLLED ANALGESIA SYRINGE INTRAVENOUS EVERY 2 HOUR PRN
Status: DISCONTINUED | OUTPATIENT
Start: 2024-01-31 | End: 2024-02-04

## 2024-01-31 RX ORDER — DONEPEZIL HYDROCHLORIDE 5 MG/1
10 TABLET, FILM COATED ORAL
Status: DISCONTINUED | OUTPATIENT
Start: 2024-02-01 | End: 2024-02-04 | Stop reason: HOSPADM

## 2024-01-31 RX ORDER — LEVOTHYROXINE SODIUM 0.03 MG/1
25 TABLET ORAL
Status: DISCONTINUED | OUTPATIENT
Start: 2024-02-01 | End: 2024-02-04 | Stop reason: HOSPADM

## 2024-01-31 RX ORDER — GABAPENTIN 100 MG/1
100 CAPSULE ORAL
Status: DISCONTINUED | OUTPATIENT
Start: 2024-01-31 | End: 2024-02-04 | Stop reason: HOSPADM

## 2024-01-31 RX ORDER — ACETAMINOPHEN 325 MG/1
975 TABLET ORAL EVERY 8 HOURS SCHEDULED
Status: DISCONTINUED | OUTPATIENT
Start: 2024-01-31 | End: 2024-02-04 | Stop reason: HOSPADM

## 2024-01-31 RX ORDER — AMOXICILLIN 250 MG
1 CAPSULE ORAL
Status: DISCONTINUED | OUTPATIENT
Start: 2024-01-31 | End: 2024-02-04 | Stop reason: HOSPADM

## 2024-01-31 RX ORDER — LIDOCAINE 50 MG/G
1 PATCH TOPICAL DAILY
Status: DISCONTINUED | OUTPATIENT
Start: 2024-01-31 | End: 2024-02-04 | Stop reason: HOSPADM

## 2024-01-31 RX ORDER — TRANEXAMIC ACID 10 MG/ML
1000 INJECTION, SOLUTION INTRAVENOUS ONCE
Status: COMPLETED | OUTPATIENT
Start: 2024-01-31 | End: 2024-01-31

## 2024-01-31 RX ADMIN — ACETAMINOPHEN 975 MG: 325 TABLET, FILM COATED ORAL at 14:39

## 2024-01-31 RX ADMIN — TRANEXAMIC ACID 1000 MG: 10 INJECTION, SOLUTION INTRAVENOUS at 23:48

## 2024-01-31 RX ADMIN — LIDOCAINE 5% 1 PATCH: 700 PATCH TOPICAL at 14:39

## 2024-01-31 RX ADMIN — SENNOSIDES, DOCUSATE SODIUM 1 TABLET: 8.6; 5 TABLET ORAL at 22:17

## 2024-01-31 RX ADMIN — ACETAMINOPHEN 975 MG: 325 TABLET, FILM COATED ORAL at 22:17

## 2024-01-31 RX ADMIN — ENOXAPARIN SODIUM 30 MG: 30 INJECTION SUBCUTANEOUS at 22:17

## 2024-01-31 RX ADMIN — GABAPENTIN 100 MG: 100 CAPSULE ORAL at 22:17

## 2024-01-31 NOTE — ED PROCEDURE NOTE
Procedure  POC FAST US    Date/Time: 1/31/2024 1:59 PM    Performed by: Momo Kaplan MD  Authorized by: Momo Kaplan MD    Procedure details:     Exam Type:  Educational    Indications comment:  Fall trauma    Assess for:  Hemothorax, intra-abdominal fluid, pneumothorax and pericardial effusion    Technique: extended FAST      Views obtained:  RUQ - Shaikh's Pouch, Suprapubic - Pouch of Oleg, LUQ - Splenorenal space, Heart - Pericardial sac, Left thorax and Right thorax    Image quality: non-diagnostic    FAST Findings:     RUQ (Hepatorenal) free fluid: absent      LUQ (Splenorenal) free fluid: absent      Suprapubic free fluid: absent      Cardiac wall motion: identified      Pericardial effusion: absent    Comments:      Limited evaluation due to  variability - grossly normal examination  POC Cardiac US    Date/Time: 1/31/2024 2:02 PM    Performed by: Momo Kaplan MD  Authorized by: Momo Kaplan MD    Procedure details:     Exam Type:  Educational  Cardiac findings:     Views obtained: parasternal long axis, parasternal short axis, subcostal and apical      Wall motion: normal    Interpretation:     Fluid Status:  Euvolemic  POC Renal US    Date/Time: 1/31/2024 2:02 PM    Performed by: Momo Kaplan MD  Authorized by: Momo Kaplan MD    Procedure details:     Exam Type:  Educational  Findings:     LEFT kidney findings: unremarkable      RIGHT kidney findings: unremarkable      Bladder:  Visualized  Interpretation:     Renal ultrasound impressions: normal exam    POC Lung US    Date/Time: 1/31/2024 2:03 PM    Performed by: Momo Kaplan MD  Authorized by: Momo Kaplan MD    Procedure details:     Exam Type:  Educational  Left Hemithorax Findings:     Left Hemithorax Findings: normal    Right Lung Findings:     Right hemithorax findings: normal    Interpretation:     Findings: indeterminate      Assisted by:  Dr. Ron Turpin, MS4              Momo Kaplan MD  01/31/24 1407

## 2024-01-31 NOTE — ASSESSMENT & PLAN NOTE
Status post fall with the below noted injuries.  Fall precautions.  Geriatric Medicine consultation for evaluation, medication review and recommendations.  PT and OT evaluation and treatment as indicated.  Case Management consultation for disposition planning.

## 2024-01-31 NOTE — H&P
H&P - Trauma   Jefry Mcclure 88 y.o. female MRN: [id number]  Unit/Bed#: ED-07 Encounter: [id number]    Trauma Alert: Evaluation; trauma team notified at 1341 via text   Model of Arrival: Ambulance    Trauma Team: Attending Colin and Chadd Woodall  Consultants:     Orthopedics: routine consult; Epic consult order placed;     Assessment/Plan   Active Problems / Assessment:   Mechanical fall  Periprosthetic fracture of right femur as seen on XR  Hypothyroidism  Dementia     Plan:   CTH negative  CT C spine pending  Pre-op TXA; pending CT cervical spine  Home Medications reviewed at this time  Appreciate orthopedic consult  Initiated on DVT prophylaxis  Family updated at bedside  Admit to [facility] at this time    Disposition: Await final orthopedics recommendations.  Family updated at bedside.  Admit to [facility].  Home medications reviewed and reordered.  Labs appreciated.    History of Present Illness     Chief Complaint: right hip pain  Mechanism:Fall     HPI:    Jefry Mcclure is a 88 y.o. female with pmhx of hypothyroidism and dementia who presents from facility after mechanical fall on 1/30. Patient is not on any anticoagulation or antiplatelet medications. She states she fell a few days ago, per son facility called today to let him know she fell 1/30 PM. She denies headstrike. She otherwise denies loss of consciousness. She denies lightheadedness or dizziness prior to and after fall. She denies chest pain, sob, abdominal pain. Per son, she has no major cardiac or pulmonary past medical history, only medications she takes per son and facility paperwork is levothyroxine and aricept aside from vitamins.     Review of Systems   Unable to perform ROS: Dementia     12-point, complete review of systems was reviewed and negative except as stated above.     Historical Information     Past Medical History:   Diagnosis Date    Arthritis      Past Surgical History:   Procedure Laterality Date    ADENOIDECTOMY      HIP  SURGERY Bilateral     KNEE SURGERY Bilateral         Social History     Tobacco Use    Smoking status: Former    Smokeless tobacco: Never   Vaping Use    Vaping status: Never Used   Substance Use Topics    Alcohol use: Yes    Drug use: Not Currently     Immunization History   Administered Date(s) Administered    COVID-19 PFIZER VACCINE 0.3 ML IM 03/10/2021, 04/11/2021, 11/16/2021     Last Tetanus: Not indicated  Family History: Non-contributory    1. Before the illness or injury that brought you to the Emergency, did you need someone to help you on a regular basis? 1=Yes   2. Since the illness or injury that brought you to the Emergency, have you needed more help than usual to take care of yourself? 1=Yes   3. Have you been hospitalized for one or more nights during the past 6 months (excluding a stay in the Emergency Department)? 0=No   4. In general, do you see well? 0=Yes   5. In general, do you have serious problems with your memory? 1=Yes   6. Do you take more than three different medications everyday? 1=Yes   TOTAL   4     Did you order a geriatric consult if the score was 2 or greater?: yes     Meds/Allergies   PTA meds:   Prior to Admission Medications   Prescriptions Last Dose Informant Patient Reported? Taking?   Calcium Carbonate-Vitamin D 600-200 MG-UNIT TABS   Yes No   Sig: Calcium 600 + D(3) 600 mg-5 mcg (200 unit) tablet   take 1 tablet by mouth twice a day   donepezil (ARICEPT) 5 mg tablet   Yes No      Facility-Administered Medications: None      No Known Allergies    Objective   Initial Vitals:   Temperature: 98.1 °F (36.7 °C) (01/31/24 1222)  Pulse: 94 (01/31/24 1219)  Respirations: 18 (01/31/24 1219)  Blood Pressure: 143/63 (01/31/24 1219)    Primary Survey:   Airway:        Status: patent;        Pre-hospital Interventions: none        Hospital Interventions: none  Breathing:        Pre-hospital Interventions: none       Effort: normal       Right breath sounds: normal       Left breath sounds:  normal  Circulation:        Rhythm: regular no murmur       Rate: regular   Right Pulses Left Pulses    R radial: 2+    R pedal: 2+     L radial: 2+    L pedal: 2+       Disability:        GCS: Eye: 4; Verbal: 5 Motor: 6 Total: 15       Right Pupil: round;  reactive         Left Pupil:  round;  reactive      R Motor Strength L Motor Strength    R : 5/5   L : 5/5          Sensory:  No sensory deficit  Exposure:       Completed: Yes      Secondary Survey:  Physical Exam  Vitals and nursing note reviewed.   Constitutional:       General: She is not in acute distress.     Appearance: Normal appearance. She is normal weight. She is not ill-appearing.   HENT:      Head: Normocephalic and atraumatic.   Eyes:      General: No scleral icterus.     Extraocular Movements: Extraocular movements intact.      Conjunctiva/sclera: Conjunctivae normal.      Pupils: Pupils are equal, round, and reactive to light.   Cardiovascular:      Rate and Rhythm: Normal rate and regular rhythm.      Pulses: Normal pulses.   Pulmonary:      Effort: Pulmonary effort is normal. No respiratory distress.      Breath sounds: Normal breath sounds. No wheezing.   Abdominal:      General: Bowel sounds are normal. There is no distension.      Palpations: Abdomen is soft.      Tenderness: There is no abdominal tenderness. There is no guarding.   Musculoskeletal:         General: Deformity present. No tenderness.      Cervical back: No tenderness.      Comments: No C, T, L spine tenderness.   Right leg is shortened and externally rotated.   Skin:     General: Skin is warm.      Capillary Refill: Capillary refill takes less than 2 seconds.   Neurological:      General: No focal deficit present.      Mental Status: She is alert and oriented to person, place, and time.   Psychiatric:         Mood and Affect: Mood normal.         Invasive Devices       Peripheral Intravenous Line  Duration             Peripheral IV 01/31/24 Left Antecubital <1 day                   Lab Results: I have personally reviewed all pertinent laboratory/test results 01/31/24 and in the preceding 24 hours.  Recent Labs     01/31/24  1352   WBC 10.56*   HGB 12.4   HCT 37.7      SODIUM 139   K 3.9      CO2 25   BUN 13   CREATININE 0.59*   GLUC 109   MG 2.2   PHOS 4.0       Imaging Results: I have personally reviewed pertinent images saved in PACS. CT scan findings (and other pertinent positive findings on images) were discussed with radiology. My interpretation of the images/reports are as follows:  Chest Xray(s): negative for acute findings   FAST exam(s): N/A   CT Scan(s): pending   Additional Xray(s): pending     Other Studies: No other studies    Code Status: Level 3 - DNAR and DNI  Advance Directive and Living Will:      Power of :    POLST:    I have spent 32 minutes with Patient and family today in which greater than 50% of this time was spent in counseling/coordination of care regarding Diagnostic results, Prognosis, Risks and benefits of tx options, Instructions for management, Patient and family education, Importance of tx compliance, Risk factor reductions, Impressions, Counseling / Coordination of care, Documenting in the medical record, Reviewing / ordering tests, medicine, procedures  , Obtaining or reviewing history  , and Communicating with other healthcare professionals .

## 2024-01-31 NOTE — ASSESSMENT & PLAN NOTE
Acute pain secondary to traumatic injuries.  Continue multimodal analgesic regimen.  Bowel regimen as long as using opioids.  Continue to monitor pain and adjust regimen as indicated.

## 2024-01-31 NOTE — ASSESSMENT & PLAN NOTE
Periprosthetic hip fracture of right side, present on admission.  Appreciate Orthopedic surgery evaluation, recommendations and interventions as noted.  2/1--planning of OR today  Maintain non weightbearing status on the right lower extremity.  Monitor right lower extremity neurovascular exam.  Continue multimodal analgesic regimen.  Continue DVT prophylaxis.  PT and OT evaluation and treatment as indicated.  Outpatient follow up with Orthopedic surgery for re-evaluation.

## 2024-01-31 NOTE — ED PROVIDER NOTES
History  Chief Complaint   Patient presents with    Fall     Pt arrives via ems from [facility] post fall. Pt slipped and fell onto right side, c/o right hip pain. Staff witnessed and denies head strike. Hx dementia, GCS 15        History provided by:  Patient  Fall  Mechanism of injury: fall    Injury location: right hip.  Incident location:  Nursing home  Fall:     Fall occurred:  Standing    Impact surface:  Hard floor    Point of impact: right side.    Entrapped after fall: no    Suspicion of alcohol use: no    Suspicion of drug use: no    Tetanus status:  Up to date  Prior to arrival data:     Patient ambulatory at scene: no      Blood loss:  None    Responsiveness at scene:  Alert    Orientation at scene:  Person, place and situation    Loss of consciousness: no      Amnesic to event: no      Airway interventions:  None    Breathing interventions:  None    IV access status:  None    IO access:  None  Associated symptoms: no abdominal pain, no back pain, no blindness, no chest pain, no difficulty breathing, no headaches, no hearing loss, no loss of consciousness, no nausea, no neck pain, no seizures and no vomiting    Risk factors: no AICD, no anticoagulation therapy, no asthma, no beta blocker therapy, no CABG, no CAD, no CHF, no COPD, no diabetes, no dialysis, no hemophilia, no kidney disease, no pacemaker, no past MI, not pregnant and no steroid use        Prior to Admission Medications   Prescriptions Last Dose Informant Patient Reported? Taking?   Calcium Carbonate-Vitamin D 600-200 MG-UNIT TABS 1/31/2024  Yes Yes   Sig: Calcium 600 + D(3) 600 mg-5 mcg (200 unit) tablet   take 1 tablet by mouth twice a day   donepezil (ARICEPT) 5 mg tablet Not Taking  Yes No   Patient not taking: Reported on 1/31/2024      Facility-Administered Medications: None       Past Medical History:   Diagnosis Date    Arthritis        Past Surgical History:   Procedure Laterality Date    ADENOIDECTOMY      HIP SURGERY Bilateral      KNEE SURGERY Bilateral        Family History   Problem Relation Age of Onset    Rheum arthritis Mother      I have reviewed and agree with the history as documented.    E-Cigarette/Vaping    E-Cigarette Use Never User      E-Cigarette/Vaping Substances     Social History     Tobacco Use    Smoking status: Former    Smokeless tobacco: Never   Vaping Use    Vaping status: Never Used   Substance Use Topics    Alcohol use: Yes    Drug use: Not Currently       Review of Systems   Constitutional:  Positive for activity change. Negative for appetite change, chills, diaphoresis, fatigue and fever.   HENT:  Negative for congestion, ear discharge, hearing loss, postnasal drip, rhinorrhea and sore throat.    Eyes:  Negative for blindness, photophobia, pain, discharge, redness, itching and visual disturbance.   Respiratory:  Negative for cough, chest tightness and shortness of breath.    Cardiovascular:  Negative for chest pain.   Gastrointestinal:  Negative for abdominal pain, diarrhea, nausea and vomiting.   Genitourinary:  Negative for dysuria, frequency and urgency.   Musculoskeletal:  Negative for back pain and neck pain.   Skin:  Negative for color change, pallor and rash.   Neurological:  Negative for seizures, loss of consciousness and headaches.   Psychiatric/Behavioral:  Negative for confusion.    All other systems reviewed and are negative.      Physical Exam  Physical Exam  Vitals and nursing note reviewed.   Constitutional:       General: She is not in acute distress.     Appearance: Normal appearance. She is not ill-appearing, toxic-appearing or diaphoretic.   HENT:      Head: Normocephalic and atraumatic.      Right Ear: Tympanic membrane and external ear normal.      Left Ear: Tympanic membrane and external ear normal.      Nose: Nose normal.      Mouth/Throat:      Mouth: Mucous membranes are moist.      Pharynx: No oropharyngeal exudate or posterior oropharyngeal erythema.   Eyes:      General:         Right  eye: No discharge.         Left eye: No discharge.      Pupils: Pupils are equal, round, and reactive to light.   Cardiovascular:      Rate and Rhythm: Normal rate and regular rhythm.      Heart sounds: Normal heart sounds.   Pulmonary:      Effort: Pulmonary effort is normal.      Breath sounds: Normal breath sounds.   Abdominal:      General: Abdomen is flat. There is no distension.      Tenderness: There is no abdominal tenderness. There is no guarding or rebound.   Musculoskeletal:      Cervical back: Neck supple. No rigidity or tenderness.   Lymphadenopathy:      Cervical: No cervical adenopathy.   Skin:     General: Skin is warm.      Capillary Refill: Capillary refill takes less than 2 seconds.   Neurological:      Mental Status: She is alert. Mental status is at baseline.      Comments: Patient is oriented to person, place but oriented to time.  She thinks that she fell yesterday.   Psychiatric:         Mood and Affect: Mood normal.         Behavior: Behavior normal.         Thought Content: Thought content normal.         Judgment: Judgment normal.         Vital Signs  ED Triage Vitals   Temperature Pulse Respirations Blood Pressure SpO2   01/31/24 1222 01/31/24 1219 01/31/24 1219 01/31/24 1219 01/31/24 1219   98.1 °F (36.7 °C) 94 18 143/63 96 %      Temp Source Heart Rate Source Patient Position - Orthostatic VS BP Location FiO2 (%)   01/31/24 1222 01/31/24 1219 01/31/24 1219 01/31/24 1219 --   Oral Monitor Lying Right arm       Pain Score       01/31/24 1219       5           Vitals:    01/31/24 1219 01/31/24 1857 01/31/24 1900   BP: 143/63 120/73    Pulse: 94 94    Patient Position - Orthostatic VS: Lying  Lying         Visual Acuity      ED Medications  Medications   gabapentin (NEURONTIN) capsule 100 mg (has no administration in time range)   acetaminophen (TYLENOL) tablet 975 mg (975 mg Oral Given 1/31/24 1439)   HYDROmorphone HCl (DILAUDID) injection 0.2 mg (has no administration in time range)    oxyCODONE (ROXICODONE) split tablet 2.5 mg (has no administration in time range)     Or   oxyCODONE (ROXICODONE) IR tablet 5 mg (has no administration in time range)   naloxone (NARCAN) 0.04 mg/mL syringe 0.04 mg (has no administration in time range)   senna-docusate sodium (SENOKOT S) 8.6-50 mg per tablet 1 tablet (has no administration in time range)   polyethylene glycol (MIRALAX) packet 17 g (0 g Oral Hold 1/31/24 1439)   lidocaine (LIDODERM) 5 % patch 1 patch (1 patch Topical Medication Applied 1/31/24 1439)   donepezil (ARICEPT) tablet 10 mg (has no administration in time range)   levothyroxine tablet 25 mcg (has no administration in time range)   enoxaparin (LOVENOX) subcutaneous injection 30 mg (has no administration in time range)       Diagnostic Studies  Results Reviewed       Procedure Component Value Units Date/Time    Urinalysis with microscopic [[id number]]  (Abnormal) Collected: 01/31/24 1623    Lab Status: Final result Specimen: Urine, Other Updated: 01/31/24 1649     Color, UA Yellow     Clarity, UA Turbid     Specific Gravity, UA 1.015     pH, UA 6.5     Leukocytes, UA Moderate     Nitrite, UA Positive     Protein, UA Negative mg/dl      Glucose, UA Negative mg/dl      Ketones, UA Negative mg/dl      Urobilinogen, UA <2.0 mg/dl      Bilirubin, UA Negative     Occult Blood, UA Negative     RBC, UA 2-4 /hpf      WBC, UA 10-20 /hpf      Epithelial Cells Occasional /hpf      Bacteria, UA Innumerable /hpf      Amorphous Crystals, UA Occasional    Protime-INR [[id number]]  (Abnormal) Collected: 01/31/24 1623    Lab Status: Final result Specimen: Blood from Arm, Left Updated: 01/31/24 1646     Protime 14.8 seconds      INR 1.09    Basic metabolic panel [[id number]]  (Abnormal) Collected: 01/31/24 1352    Lab Status: Final result Specimen: Blood from Arm, Left Updated: 01/31/24 1420     Sodium 139 mmol/L      Potassium 3.9 mmol/L      Chloride 106 mmol/L      CO2 25 mmol/L      ANION GAP 8 mmol/L      BUN  13 mg/dL      Creatinine 0.59 mg/dL      Glucose 109 mg/dL      Calcium 9.0 mg/dL      eGFR 82 ml/min/1.73sq m     Narrative:      National Kid[employer] Disease Foundation guidelines for Chronic Kidney Disease (CKD):     Stage 1 with normal or high GFR (GFR > 90 mL/min/1.73 square meters)    Stage 2 Mild CKD (GFR = 60-89 mL/min/1.73 square meters)    Stage 3A Moderate CKD (GFR = 45-59 mL/min/1.73 square meters)    Stage 3B Moderate CKD (GFR = 30-44 mL/min/1.73 square meters)    Stage 4 Severe CKD (GFR = 15-29 mL/min/1.73 square meters)    Stage 5 End Stage CKD (GFR <15 mL/min/1.73 square meters)  Note: GFR calculation is accurate only with a steady state creatinine    Magnesium [[id number]]  (Normal) Collected: 01/31/24 1352    Lab Status: Final result Specimen: Blood from Arm, Left Updated: 01/31/24 1420     Magnesium 2.2 mg/dL     Phosphorus [[id number]]  (Normal) Collected: 01/31/24 1352    Lab Status: Final result Specimen: Blood from Arm, Left Updated: 01/31/24 1420     Phosphorus 4.0 mg/dL     CBC and differential [[id number]]  (Abnormal) Collected: 01/31/24 1352    Lab Status: Final result Specimen: Blood from Arm, Left Updated: 01/31/24 1404     WBC 10.56 Thousand/uL      RBC 4.01 Million/uL      Hemoglobin 12.4 g/dL      Hematocrit 37.7 %      MCV 94 fL      MCH 30.9 pg      MCHC 32.9 g/dL      RDW 13.9 %      MPV 11.6 fL      Platelets 172 Thousands/uL      nRBC 0 /100 WBCs      Neutrophils Relative 75 %      Immat GRANS % 0 %      Lymphocytes Relative 13 %      Monocytes Relative 10 %      Eosinophils Relative 1 %      Basophils Relative 1 %      Neutrophils Absolute 7.95 Thousands/µL      Immature Grans Absolute 0.04 Thousand/uL      Lymphocytes Absolute 1.35 Thousands/µL      Monocytes Absolute 1.07 Thousand/µL      Eosinophils Absolute 0.07 Thousand/µL      Basophils Absolute 0.08 Thousands/µL                    CT spine cervical wo contrast   Final Result by Alisson Bar MD (01/31 1507)      No acute  compression collapse of the vertebral   Multilevel degenerative disc disease with central narrowing at C3-4, C4-5,, C6/7                     Workstation performed: [id number]         CT lower extremity wo contrast right   Final Result by German House MD (01/31 1445)      Acute mildly displaced intertrochanteric/subtrochanteric periprosthetic femur fracture.      Workstation performed: [id number]         CT head without contrast   Final Result by Maulik Chaparro MD (01/31 2649)      No acute intracranial abnormality.                  Workstation performed: [id number]         XR femur 2 vw right   ED Interpretation by Julie Lynn Gutzweiler, PA-C (01/31 1327)   Periprosthetic fracture proximal femur subtrochanteric      XR pelvis ap only 1 or 2 vw    (Results Pending)   XR chest portable    (Results Pending)              Procedures  Procedures         ED Course  ED Course as of 01/31/24 2017 Wed Jan 31, 2024   1409 Fast exqm performed by [facility]-normal                                             Medical Decision Making  This 88-year-old female presents emergency room from [facility].  She states that she fell yesterday.  The EMS report was that she fell this morning.  Patient has a history of dementia.  She complains of pain in her right.  She denies any head strike.  She denies any neck pain she denies any chest or abdominal pain.  She is able to move her upper extremities with no discomfort.  She states that she did get up and attempt to walk that she had pain in her right leg.  She denies any back pain.  She denies any pain in her left leg.    Past medical history is positive for dementia, arthritis.    Physical exam this 88-year-old female is alert and oriented x 3.  She is in no acute distress.  Her head is atraumatic.  There is no soft tissue swelling or hematomas present.  Her pupils are equal and reactive to light.  Her lungs are clear.  Her heart is regular rate and rhythm with 2/6 systolic  murmur.  Her abdomen is soft nondistended nontender without mass or hepatosplenomegaly.  Her neck is supple upon palpation without evidence of injury.  There are no step-offs or tenderness with palpation.  I do not palpate any paravertebral spasm.  Upon inspection of her upper extremities there are atraumatic in appearance.  She has good range of motion of both upper extremities without pain or deformity.  Upon inspection of both lower extremities.  She does have foreshortening of the right leg and external rotation of the leg.  She does have tenderness with pain rolling of her right leg.  She complains of pain in her right hip.  Her thigh and the lower leg ankle and foot are nontender with good range of motion.  I I am unable to range the right knee knee because the right hip pain.  The left leg is nontender with good range of motion.    Differential diagnosis includes but is not limited to acute right hip fracture, pelvic fracture.  Acetabular fracture, sprain, strain, bridging injury secondary to patient having a history of dementia.  Will rule out intracranial bleed.  Blunt chest or abdominal trauma, compression fracture spine.    Plan FAST exam to rule out blunt injury to the abdomen.    X-rays of the right hip and CT of the head demonstrated a periprosthetic fracture of the proximal right femur.  It is subtrochanteric.  Patient has a previous a total hip replacement with 1 cerclage wire in place.  Cerclage wire is intact.  The remainder of the pelvis does not appear to be fractured.    Impression  Mechanical fall  Periprosthetic fracture right proximal femur, status post total hip replacement with intact cerclage wire    Plan  Consult trauma  Consult Ortho        Amount and/or Complexity of Data Reviewed  Radiology: ordered and independent interpretation performed.    Risk  Decision regarding hospitalization.             Disposition  Final diagnoses:   Periprosthetic fracture around internal prosthetic right  hip joint, initial encounter (HCC)   Fall, initial encounter - Acute mechanical fall from standing position   Dementia (Formerly [facility])   Degenerative arthritis     Time reflects when diagnosis was documented in both MDM as applicable and the Disposition within this note       Time User Action Codes Description Comment    1/31/2024  1:37 PM Gutzweiler, Julie Add [M97.01XA] Periprosthetic fracture around internal prosthetic right hip joint, initial encounter ([facility])     1/31/2024  2:05 PM Gutzweiler, Julie Add [W19.XXXA] Fall, initial encounter     1/31/2024  2:05 PM Gutzweiler, Julie Modify [W19.XXXA] Fall, initial encounter Acute mechanical fall from standing position    1/31/2024  2:05 PM Gutzweiler, Julie Add [F03.90] Dementia ([facility])     1/31/2024  2:05 PM Gutzweiler, Julie Add [M19.90] Degenerative arthritis           ED Disposition       ED Disposition   Admit    Condition   Stable    Date/Time   Wed Jan 31, 2024  2:04 PM    Comment   Case was discussed with Johnny Oakes PA-C and the patient's admission status was agreed to be Admission Status: inpatient status to the service of Dr. Shaw .               Follow-up Information    None         Current Discharge Medication List        CONTINUE these medications which have NOT CHANGED    Details   Calcium Carbonate-Vitamin D 600-200 MG-UNIT TABS Calcium 600 + D(3) 600 mg-5 mcg (200 unit) tablet   take 1 tablet by mouth twice a day      donepezil (ARICEPT) 5 mg tablet              No discharge procedures on file.    PDMP Review       None            ED Provider  Electronically Signed by             Julie Lynn Gutzweiler, PA-C  01/31/24 2018

## 2024-01-31 NOTE — CONSULTS
Orthopedics   Jefry Mcclure 88 y.o. female MRN: [id number]  Unit/Bed#: AN CT SCAN      Chief Complaint:   Right leg/thigh pain     HPI:  88 y.o. female with history of dementia and hypothyroidism, accompanied by her son who aides in history. She has history of bilateral hip replacements performed roughly 20 years ago, and bilateral knee replacements. He reports one of her hips was revised at one point secondary to dislocations, unsure when this occurred. Reports that his mother fell last night at her assisted living community. At present, she is complaining of right sided hip pain into the mid femur. She has no other complaints at present. She has been found to have a periprosthetic fracture, for which orthopedics has been engaged.     Review Of Systems:   Skin: Normal  Neuro: See HPI  Musculoskeletal: See HPI  14 point review of systems negative except as stated above     Past Medical History:   Past Medical History:   Diagnosis Date    Arthritis        Past Surgical History:   Past Surgical History:   Procedure Laterality Date    ADENOIDECTOMY      HIP SURGERY Bilateral     KNEE SURGERY Bilateral        Family History:  Family history reviewed and non-contributory  Family History   Problem Relation Age of Onset    Rheum arthritis Mother        Social History:  Social History     Socioeconomic History    Marital status:      Spouse name: None    Number of children: None    Years of education: None    Highest education level: None   Occupational History    None   Tobacco Use    Smoking status: Former    Smokeless tobacco: Never   Vaping Use    Vaping status: Never Used   Substance and Sexual Activity    Alcohol use: Yes    Drug use: Not Currently    Sexual activity: None   Other Topics Concern    None   Social History Narrative    None     Social Determinants of Health     Financial Resource Strain: Not on file   Food Insecurity: Not on file   Transportation Needs: Not on file   Physical Activity: Not on  file   Stress: Not on file   Social Connections: Not on file   Intimate Partner Violence: Not on file   Housing Stability: Not on file       Allergies:   No Known Allergies        Labs:  0   Lab Value Date/Time    HCT 37.7 01/31/2024 1352    HCT 44.1 10/05/2022 1050    HCT 44.4 03/28/2022 0954    HGB 12.4 01/31/2024 1352    HGB 14.8 10/05/2022 1050    HGB 14.8 03/28/2022 0954    WBC 10.56 (H) 01/31/2024 1352    WBC 8.82 10/05/2022 1050    WBC 7.99 03/28/2022 0954       Meds:    Current Facility-Administered Medications:     acetaminophen (TYLENOL) tablet 975 mg, 975 mg, Oral, Q8H MYNOR, Ron Oakes PA-C, 975 mg at 01/31/24 1439    [START ON 2/1/2024] donepezil (ARICEPT) tablet 10 mg, 10 mg, Oral, HS, Ron Oakes PA-C    enoxaparin (LOVENOX) subcutaneous injection 30 mg, 30 mg, Subcutaneous, Q12H MYNOR, Ron Oakes PA-C    gabapentin (NEURONTIN) capsule 100 mg, 100 mg, Oral, HS, Ron Oakes PA-C    HYDROmorphone HCl (DILAUDID) injection 0.2 mg, 0.2 mg, Intravenous, Q2H PRN, Ron Oakes PA-C    [START ON 2/1/2024] levothyroxine tablet 25 mcg, 25 mcg, Oral, Early Morning, Ron Oakes PA-C    lidocaine (LIDODERM) 5 % patch 1 patch, 1 patch, Topical, Daily, Ron Oakes PA-C, 1 patch at 01/31/24 1439    naloxone (NARCAN) 0.04 mg/mL syringe 0.04 mg, 0.04 mg, Intravenous, Q1MIN PRN, Ron Oakes PA-C    oxyCODONE (ROXICODONE) split tablet 2.5 mg, 2.5 mg, Oral, Q4H PRN **OR** oxyCODONE (ROXICODONE) IR tablet 5 mg, 5 mg, Oral, Q4H PRN, Ron Oakes PA-C    polyethylene glycol (MIRALAX) packet 17 g, 17 g, Oral, Daily, Ron Oakes PA-C    senna-docusate sodium (SENOKOT S) 8.6-50 mg per tablet 1 tablet, 1 tablet, Oral, HS, Ron Oakes PA-C    Current Outpatient Medications:     Calcium Carbonate-Vitamin D 600-200 MG-UNIT TABS, Calcium 600 + D(3) 600 mg-5 mcg (200 unit) tablet  take 1 tablet by mouth twice a day, Disp: , Rfl:     donepezil (ARICEPT) 5 mg tablet, , Disp: , Rfl:     Blood  "Culture:   No results found for: \"BLOODCX\"    Wound Culture:   No results found for: \"WOUNDCULT\"    Ins and Outs:  No intake/output data recorded.          Physical Exam:   /63 (BP Location: Right arm)   Pulse 94   Temp 98.1 °F (36.7 °C) (Oral)   Resp 18   SpO2 96%   Gen: No acute distress, resting comfortably in bed  HEENT: Eyes clear, moist mucus membranes, hearing intact  Respiratory: No audible wheezing or stridor  Cardiovascular: Well Perfused peripherally, 2+ distal pulse  Abdomen: nondistended, no peritoneal signs  Musculoskeletal: bilateral upper extremity  Skin intact  No significant ttp over the bilateral clavicles, shoulders, upper arms, elbows, forearms, wrists or hands.   ROM full.   SILT m/r/u.   Motor intact ain/pin/m/r/u  2+ radial and ulnar pulse bilaterally.   Musculature is soft and compressible, no pain with passive stretch bilaterally.     Musculoskeletal: bilateral lower extremity  Skin intact. Well healed incisions posterior lateral thighs and over anterior knees bilaterally.   +ttp over the right hip and mid thigh.   No ttp over the left hip, bilateral knees, bilateral lower legs, bilateral ankles or feet.   SILT s/s/sp/dp/t. Bilaterally   +ankle dorsi/plantar flexion, EHL/FHL bilaterally.   2+ DP/PT pulse bilaterally.   Musculature is soft and compressible, no pain with passive stretch bilaterally.   Leg lengths equal    Tertiary: no tenderness over all other joints/long bones as except already stated.    Radiology:   I personally reviewed the films.  Imaging reviewed and discussed with Dr. Evangelista.   XRAY right femur/CT right lower extremity: acute mildly displaced peritrochanteric periprosthetic femur fracture.     _*_*_*_*_*_*_*_*_*_*_*_*_*_*_*_*_*_*_*_*_*_*_*_*_*_*_*_*_*_*_*_*_*_*_*_*_*_*_*_*_*    Assessment:  88 y.o.female s/p fall with acute mildly displaced peritrochanteric periprosthetic femur fracture.     Plan:   Discussed management options with patients POA, Son at " bedside.   Discussed non operative and operative management at length. At this time he would like to proceed with operative intervention for his mother.   Will plan for operative intervention 2/1/2024  NPO after midnight  Pre operative abx ordered  Intra operative txa ordered.   Consent obtained at bedside.   Pain control per primary team.   DVT ppx per primary team.   Medical management per primary team.   Dispo: Ortho will follow  Case reviewed and discussed with Dr. Evangelista.     Shira Grossman PA-C

## 2024-02-01 ENCOUNTER — ANESTHESIA EVENT (INPATIENT)
Dept: PERIOP | Facility: HOSPITAL | Age: 89
DRG: 481 | End: 2024-02-01
Payer: MEDICARE

## 2024-02-01 ENCOUNTER — ANESTHESIA (INPATIENT)
Dept: PERIOP | Facility: HOSPITAL | Age: 89
DRG: 481 | End: 2024-02-01
Payer: MEDICARE

## 2024-02-01 ENCOUNTER — APPOINTMENT (INPATIENT)
Dept: RADIOLOGY | Facility: HOSPITAL | Age: 89
DRG: 481 | End: 2024-02-01
Payer: MEDICARE

## 2024-02-01 PROBLEM — N39.0 UTI (URINARY TRACT INFECTION): Status: ACTIVE | Noted: 2024-02-01

## 2024-02-01 LAB
ANION GAP SERPL CALCULATED.3IONS-SCNC: 6 MMOL/L
BASOPHILS # BLD AUTO: 0.07 THOUSANDS/ÂΜL (ref 0–0.1)
BASOPHILS NFR BLD AUTO: 1 % (ref 0–1)
BUN SERPL-MCNC: 15 MG/DL (ref 5–25)
CALCIUM SERPL-MCNC: 8.2 MG/DL (ref 8.4–10.2)
CHLORIDE SERPL-SCNC: 109 MMOL/L (ref 96–108)
CO2 SERPL-SCNC: 25 MMOL/L (ref 21–32)
CREAT SERPL-MCNC: 0.49 MG/DL (ref 0.6–1.3)
EOSINOPHIL # BLD AUTO: 0.19 THOUSAND/ÂΜL (ref 0–0.61)
EOSINOPHIL NFR BLD AUTO: 3 % (ref 0–6)
ERYTHROCYTE [DISTWIDTH] IN BLOOD BY AUTOMATED COUNT: 14.2 % (ref 11.6–15.1)
GFR SERPL CREATININE-BSD FRML MDRD: 87 ML/MIN/1.73SQ M
GLUCOSE SERPL-MCNC: 98 MG/DL (ref 65–140)
HCT VFR BLD AUTO: 33.5 % (ref 34.8–46.1)
HGB BLD-MCNC: 11.2 G/DL (ref 11.5–15.4)
IMM GRANULOCYTES # BLD AUTO: 0.04 THOUSAND/UL (ref 0–0.2)
IMM GRANULOCYTES NFR BLD AUTO: 1 % (ref 0–2)
LYMPHOCYTES # BLD AUTO: 1.42 THOUSANDS/ÂΜL (ref 0.6–4.47)
LYMPHOCYTES NFR BLD AUTO: 19 % (ref 14–44)
MAGNESIUM SERPL-MCNC: 2.1 MG/DL (ref 1.9–2.7)
MCH RBC QN AUTO: 31.1 PG (ref 26.8–34.3)
MCHC RBC AUTO-ENTMCNC: 33.4 G/DL (ref 31.4–37.4)
MCV RBC AUTO: 93 FL (ref 82–98)
MONOCYTES # BLD AUTO: 0.84 THOUSAND/ÂΜL (ref 0.17–1.22)
MONOCYTES NFR BLD AUTO: 11 % (ref 4–12)
NEUTROPHILS # BLD AUTO: 4.78 THOUSANDS/ÂΜL (ref 1.85–7.62)
NEUTS SEG NFR BLD AUTO: 65 % (ref 43–75)
NRBC BLD AUTO-RTO: 0 /100 WBCS
PHOSPHATE SERPL-MCNC: 4 MG/DL (ref 2.3–4.1)
PLATELET # BLD AUTO: 149 THOUSANDS/UL (ref 149–390)
PMV BLD AUTO: 12.6 FL (ref 8.9–12.7)
POTASSIUM SERPL-SCNC: 3.7 MMOL/L (ref 3.5–5.3)
RBC # BLD AUTO: 3.6 MILLION/UL (ref 3.81–5.12)
SODIUM SERPL-SCNC: 140 MMOL/L (ref 135–147)
WBC # BLD AUTO: 7.34 THOUSAND/UL (ref 4.31–10.16)

## 2024-02-01 PROCEDURE — C1713 ANCHOR/SCREW BN/BN,TIS/BN: HCPCS | Performed by: STUDENT IN AN ORGANIZED HEALTH CARE EDUCATION/TRAINING PROGRAM

## 2024-02-01 PROCEDURE — 85025 COMPLETE CBC W/AUTO DIFF WBC: CPT | Performed by: PHYSICIAN ASSISTANT

## 2024-02-01 PROCEDURE — 99232 SBSQ HOSP IP/OBS MODERATE 35: CPT | Performed by: SURGERY

## 2024-02-01 PROCEDURE — 80048 BASIC METABOLIC PNL TOTAL CA: CPT | Performed by: PHYSICIAN ASSISTANT

## 2024-02-01 PROCEDURE — 83735 ASSAY OF MAGNESIUM: CPT | Performed by: PHYSICIAN ASSISTANT

## 2024-02-01 PROCEDURE — 73552 X-RAY EXAM OF FEMUR 2/>: CPT

## 2024-02-01 PROCEDURE — 27507 TREATMENT OF THIGH FRACTURE: CPT | Performed by: STUDENT IN AN ORGANIZED HEALTH CARE EDUCATION/TRAINING PROGRAM

## 2024-02-01 PROCEDURE — NC001 PR NO CHARGE: Performed by: STUDENT IN AN ORGANIZED HEALTH CARE EDUCATION/TRAINING PROGRAM

## 2024-02-01 PROCEDURE — 84100 ASSAY OF PHOSPHORUS: CPT | Performed by: PHYSICIAN ASSISTANT

## 2024-02-01 PROCEDURE — 0QS604Z REPOSITION RIGHT UPPER FEMUR WITH INTERNAL FIXATION DEVICE, OPEN APPROACH: ICD-10-PCS | Performed by: SURGERY

## 2024-02-01 DEVICE — 5.0MM PERIPROSTHETIC VA LCKNG SCREW/SLF-TPNG/STRDRV/16MM: Type: IMPLANTABLE DEVICE | Site: FEMUR | Status: FUNCTIONAL

## 2024-02-01 DEVICE — 3.5MM STARDRIVE CORTEX SCREW SELF-TAPPING 60MM: Type: IMPLANTABLE DEVICE | Site: FEMUR | Status: FUNCTIONAL

## 2024-02-01 DEVICE — 4.5MM CORTEX SCREW SELF-TAPPING 38MM: Type: IMPLANTABLE DEVICE | Site: FEMUR | Status: FUNCTIONAL

## 2024-02-01 DEVICE — 3.5MM STARDRIVE CORTEX SCREW SELF-TAPPING 32MM: Type: IMPLANTABLE DEVICE | Site: FEMUR | Status: FUNCTIONAL

## 2024-02-01 DEVICE — 5.0MM VARIABLE ANGLE LOCKNG SCREW/SLF-TPNG/STRDRV/32MM: Type: IMPLANTABLE DEVICE | Site: FEMUR | Status: FUNCTIONAL

## 2024-02-01 DEVICE — 1.7MM CABLE WITH CRIMP 750MM-STERILE: Type: IMPLANTABLE DEVICE | Site: FEMUR | Status: FUNCTIONAL

## 2024-02-01 DEVICE — 3.5MM VA PPFX DSTL FEMUR SPAN ATTCHMT PL/4H/RIGHT: Type: IMPLANTABLE DEVICE | Site: FEMUR | Status: FUNCTIONAL

## 2024-02-01 DEVICE — 4.5MM CORTEX SCREW SELF-TAPPING 42MM: Type: IMPLANTABLE DEVICE | Site: FEMUR | Status: FUNCTIONAL

## 2024-02-01 DEVICE — 3.5MM STARDRIVE CORTEX SCREW SELF-TAPPING 70MM: Type: IMPLANTABLE DEVICE | Site: FEMUR | Status: FUNCTIONAL

## 2024-02-01 DEVICE — 3.5MM STARDRIVE CORTEX SCREW SELF-TAPPING 34MM: Type: IMPLANTABLE DEVICE | Site: FEMUR | Status: FUNCTIONAL

## 2024-02-01 DEVICE — 3.5/4.5MM VA PPFX PROX FEMUR HOOK PL/SMALL/10HOLES/RIGHT: Type: IMPLANTABLE DEVICE | Site: FEMUR | Status: FUNCTIONAL

## 2024-02-01 DEVICE — 3.5MM VARIABLE ANGLE LOCKING SCREW/SLF-TPNG/STRDRV/30MM: Type: IMPLANTABLE DEVICE | Site: FEMUR | Status: FUNCTIONAL

## 2024-02-01 DEVICE — 5.0MM PERIPROSTHETIC VA LCKNG SCREW/SLF-TPNG/STRDRV/18MM: Type: IMPLANTABLE DEVICE | Site: FEMUR | Status: FUNCTIONAL

## 2024-02-01 DEVICE — 3.5MM VARIABLE ANGLE LOCKING SCREW/SLF-TPNG/STRDRV/44MM: Type: IMPLANTABLE DEVICE | Site: FEMUR | Status: FUNCTIONAL

## 2024-02-01 DEVICE — CONNECTING SCREW F/LCKNG ATTACHMENT PL/STARDRIVE-STER: Type: IMPLANTABLE DEVICE | Site: FEMUR | Status: FUNCTIONAL

## 2024-02-01 RX ORDER — ONDANSETRON 2 MG/ML
INJECTION INTRAMUSCULAR; INTRAVENOUS AS NEEDED
Status: DISCONTINUED | OUTPATIENT
Start: 2024-02-01 | End: 2024-02-01

## 2024-02-01 RX ORDER — ONDANSETRON 2 MG/ML
4 INJECTION INTRAMUSCULAR; INTRAVENOUS ONCE AS NEEDED
Status: DISCONTINUED | OUTPATIENT
Start: 2024-02-01 | End: 2024-02-01 | Stop reason: HOSPADM

## 2024-02-01 RX ORDER — EPHEDRINE SULFATE 50 MG/ML
INJECTION INTRAVENOUS AS NEEDED
Status: DISCONTINUED | OUTPATIENT
Start: 2024-02-01 | End: 2024-02-01

## 2024-02-01 RX ORDER — HYDROMORPHONE HCL IN WATER/PF 6 MG/30 ML
0.2 PATIENT CONTROLLED ANALGESIA SYRINGE INTRAVENOUS
Status: DISCONTINUED | OUTPATIENT
Start: 2024-02-01 | End: 2024-02-01 | Stop reason: HOSPADM

## 2024-02-01 RX ORDER — BUPIVACAINE HYDROCHLORIDE 5 MG/ML
INJECTION, SOLUTION EPIDURAL; INTRACAUDAL
Status: COMPLETED | OUTPATIENT
Start: 2024-02-01 | End: 2024-02-01

## 2024-02-01 RX ORDER — CEFAZOLIN SODIUM 2 G/50ML
2000 SOLUTION INTRAVENOUS EVERY 8 HOURS
Status: COMPLETED | OUTPATIENT
Start: 2024-02-01 | End: 2024-02-02

## 2024-02-01 RX ORDER — SODIUM CHLORIDE, SODIUM LACTATE, POTASSIUM CHLORIDE, CALCIUM CHLORIDE 600; 310; 30; 20 MG/100ML; MG/100ML; MG/100ML; MG/100ML
INJECTION, SOLUTION INTRAVENOUS CONTINUOUS PRN
Status: DISCONTINUED | OUTPATIENT
Start: 2024-02-01 | End: 2024-02-01

## 2024-02-01 RX ORDER — PROPOFOL 10 MG/ML
INJECTION, EMULSION INTRAVENOUS AS NEEDED
Status: DISCONTINUED | OUTPATIENT
Start: 2024-02-01 | End: 2024-02-01

## 2024-02-01 RX ORDER — ROCURONIUM BROMIDE 10 MG/ML
INJECTION, SOLUTION INTRAVENOUS AS NEEDED
Status: DISCONTINUED | OUTPATIENT
Start: 2024-02-01 | End: 2024-02-01

## 2024-02-01 RX ORDER — TRANEXAMIC ACID 10 MG/ML
1000 INJECTION, SOLUTION INTRAVENOUS
Status: COMPLETED | OUTPATIENT
Start: 2024-02-01 | End: 2024-02-01

## 2024-02-01 RX ORDER — CEFAZOLIN SODIUM 2 G/50ML
2000 SOLUTION INTRAVENOUS
Status: COMPLETED | OUTPATIENT
Start: 2024-02-01 | End: 2024-02-01

## 2024-02-01 RX ORDER — SODIUM CHLORIDE, SODIUM LACTATE, POTASSIUM CHLORIDE, CALCIUM CHLORIDE 600; 310; 30; 20 MG/100ML; MG/100ML; MG/100ML; MG/100ML
50 INJECTION, SOLUTION INTRAVENOUS CONTINUOUS
Status: DISCONTINUED | OUTPATIENT
Start: 2024-02-01 | End: 2024-02-02

## 2024-02-01 RX ORDER — FENTANYL CITRATE 50 UG/ML
INJECTION, SOLUTION INTRAMUSCULAR; INTRAVENOUS AS NEEDED
Status: DISCONTINUED | OUTPATIENT
Start: 2024-02-01 | End: 2024-02-01

## 2024-02-01 RX ORDER — BUPIVACAINE HYDROCHLORIDE 2.5 MG/ML
INJECTION, SOLUTION EPIDURAL; INFILTRATION; INTRACAUDAL
Status: COMPLETED | OUTPATIENT
Start: 2024-02-01 | End: 2024-02-01

## 2024-02-01 RX ORDER — PROPOFOL 10 MG/ML
INJECTION, EMULSION INTRAVENOUS CONTINUOUS PRN
Status: DISCONTINUED | OUTPATIENT
Start: 2024-02-01 | End: 2024-02-01

## 2024-02-01 RX ORDER — LIDOCAINE HYDROCHLORIDE 10 MG/ML
INJECTION, SOLUTION EPIDURAL; INFILTRATION; INTRACAUDAL; PERINEURAL AS NEEDED
Status: DISCONTINUED | OUTPATIENT
Start: 2024-02-01 | End: 2024-02-01

## 2024-02-01 RX ORDER — FENTANYL CITRATE/PF 50 MCG/ML
25 SYRINGE (ML) INJECTION
Status: DISCONTINUED | OUTPATIENT
Start: 2024-02-01 | End: 2024-02-01 | Stop reason: HOSPADM

## 2024-02-01 RX ORDER — VANCOMYCIN HYDROCHLORIDE 1 G/20ML
INJECTION, POWDER, LYOPHILIZED, FOR SOLUTION INTRAVENOUS AS NEEDED
Status: DISCONTINUED | OUTPATIENT
Start: 2024-02-01 | End: 2024-02-01 | Stop reason: HOSPADM

## 2024-02-01 RX ORDER — MAGNESIUM HYDROXIDE 1200 MG/15ML
LIQUID ORAL AS NEEDED
Status: DISCONTINUED | OUTPATIENT
Start: 2024-02-01 | End: 2024-02-01 | Stop reason: HOSPADM

## 2024-02-01 RX ORDER — DEXAMETHASONE SODIUM PHOSPHATE 10 MG/ML
INJECTION, SOLUTION INTRAMUSCULAR; INTRAVENOUS AS NEEDED
Status: DISCONTINUED | OUTPATIENT
Start: 2024-02-01 | End: 2024-02-01

## 2024-02-01 RX ORDER — METOCLOPRAMIDE HYDROCHLORIDE 5 MG/ML
10 INJECTION INTRAMUSCULAR; INTRAVENOUS ONCE AS NEEDED
Status: DISCONTINUED | OUTPATIENT
Start: 2024-02-01 | End: 2024-02-01 | Stop reason: HOSPADM

## 2024-02-01 RX ADMIN — ONDANSETRON 4 MG: 2 INJECTION INTRAMUSCULAR; INTRAVENOUS at 14:53

## 2024-02-01 RX ADMIN — PROPOFOL 120 MG: 10 INJECTION, EMULSION INTRAVENOUS at 12:06

## 2024-02-01 RX ADMIN — CEFTRIAXONE SODIUM 1000 MG: 10 INJECTION, POWDER, FOR SOLUTION INTRAVENOUS at 09:37

## 2024-02-01 RX ADMIN — SODIUM CHLORIDE, SODIUM LACTATE, POTASSIUM CHLORIDE, AND CALCIUM CHLORIDE 50 ML/HR: .6; .31; .03; .02 INJECTION, SOLUTION INTRAVENOUS at 17:41

## 2024-02-01 RX ADMIN — PROPOFOL 80 MCG/KG/MIN: 10 INJECTION, EMULSION INTRAVENOUS at 12:15

## 2024-02-01 RX ADMIN — LEVOTHYROXINE SODIUM 25 MCG: 25 TABLET ORAL at 05:52

## 2024-02-01 RX ADMIN — TRANEXAMIC ACID 1000 MG: 10 INJECTION, SOLUTION INTRAVENOUS at 12:31

## 2024-02-01 RX ADMIN — DEXAMETHASONE SODIUM PHOSPHATE 10 MG: 10 INJECTION, SOLUTION INTRAMUSCULAR; INTRAVENOUS at 12:10

## 2024-02-01 RX ADMIN — CEFAZOLIN SODIUM 2000 MG: 2 SOLUTION INTRAVENOUS at 21:14

## 2024-02-01 RX ADMIN — LIDOCAINE 5% 1 PATCH: 700 PATCH TOPICAL at 09:36

## 2024-02-01 RX ADMIN — BUPIVACAINE HYDROCHLORIDE 20 ML: 5 INJECTION, SOLUTION EPIDURAL; INTRACAUDAL at 11:19

## 2024-02-01 RX ADMIN — BUPIVACAINE HYDROCHLORIDE 5 ML: 2.5 INJECTION, SOLUTION EPIDURAL; INFILTRATION; INTRACAUDAL; PERINEURAL at 11:19

## 2024-02-01 RX ADMIN — DONEPEZIL HYDROCHLORIDE 10 MG: 5 TABLET ORAL at 21:14

## 2024-02-01 RX ADMIN — SENNOSIDES, DOCUSATE SODIUM 1 TABLET: 8.6; 5 TABLET ORAL at 21:14

## 2024-02-01 RX ADMIN — OXYCODONE HYDROCHLORIDE 5 MG: 5 TABLET ORAL at 17:45

## 2024-02-01 RX ADMIN — PHENYLEPHRINE HYDROCHLORIDE 10 MCG/MIN: 10 INJECTION INTRAVENOUS at 12:15

## 2024-02-01 RX ADMIN — HYDROMORPHONE HYDROCHLORIDE 0.2 MG: 0.2 INJECTION, SOLUTION INTRAMUSCULAR; INTRAVENOUS; SUBCUTANEOUS at 21:14

## 2024-02-01 RX ADMIN — SODIUM CHLORIDE, SODIUM LACTATE, POTASSIUM CHLORIDE, CALCIUM CHLORIDE: 600; 310; 30; 20 INJECTION, SOLUTION INTRAVENOUS at 12:40

## 2024-02-01 RX ADMIN — GABAPENTIN 100 MG: 100 CAPSULE ORAL at 21:14

## 2024-02-01 RX ADMIN — FENTANYL CITRATE 100 MCG: 50 INJECTION INTRAMUSCULAR; INTRAVENOUS at 12:06

## 2024-02-01 RX ADMIN — ACETAMINOPHEN 975 MG: 325 TABLET, FILM COATED ORAL at 05:51

## 2024-02-01 RX ADMIN — LIDOCAINE HYDROCHLORIDE 50 MG: 10 INJECTION, SOLUTION EPIDURAL; INFILTRATION; INTRACAUDAL; PERINEURAL at 12:06

## 2024-02-01 RX ADMIN — SUGAMMADEX 200 MG: 100 INJECTION, SOLUTION INTRAVENOUS at 14:47

## 2024-02-01 RX ADMIN — ROCURONIUM BROMIDE 50 MG: 10 INJECTION, SOLUTION INTRAVENOUS at 12:06

## 2024-02-01 RX ADMIN — ENOXAPARIN SODIUM 30 MG: 30 INJECTION SUBCUTANEOUS at 21:14

## 2024-02-01 RX ADMIN — EPHEDRINE SULFATE 10 MG: 50 INJECTION INTRAVENOUS at 12:26

## 2024-02-01 RX ADMIN — CEFAZOLIN SODIUM 2000 MG: 2 SOLUTION INTRAVENOUS at 12:18

## 2024-02-01 RX ADMIN — SODIUM CHLORIDE, SODIUM LACTATE, POTASSIUM CHLORIDE, AND CALCIUM CHLORIDE: .6; .31; .03; .02 INJECTION, SOLUTION INTRAVENOUS at 11:59

## 2024-02-01 RX ADMIN — ACETAMINOPHEN 975 MG: 325 TABLET, FILM COATED ORAL at 21:14

## 2024-02-01 NOTE — OP NOTE
OPERATIVE REPORT  PATIENT NAME: Jefry Mcclure    :  1935  MRN: [id number]  Pt Location: AN OR ROOM 01    SURGERY DATE: 2024    Surgeon(s) and Role:     * Wilbur Evangelista DO - Primary     * Roman Sims MD - Assisting     * Amado Ghosh PA-C - Assisting    I was present for the entire procedure. and I was present for all critical portions of the procedure.    Preop Diagnosis:  #1 right Erie B1 periprosthetic proximal femur fracture    Post-Op Diagnosis:  1 right [place] B1 periprosthetic femur fracture    Procedures:  #1 open reduction internal fixation of right periprosthetic proximal femur fracture      Specimen(s):  None    Estimated Blood Loss:   100 cc    Drains:  15 Jamaican drain subvastus approach    Anesthesia Type:   General endotracheal    Operative Indications:  Patient is an 88-year-old female that had periprosthetic proximal femur fracture.  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  The patient's x-rays when she reported after a fall and significant pain and inability to weight-bear to the right lower extremity demonstrated a right periprosthetic femur fracture.  The CT demonstrated an oblique propagation of this fracture to the tip of the stem.  The patient had a previously placed cerclage which was likely from her first revision.  I discussed nonoperative versus operative management options with the patient's power of  and herself.  In order to restore ambulatory status and decrease the complications associate with a bedridden status patient was consented for open reduction internal fixation      Implants:   Synthes 10 hole proximal femoral hook plate with 4 hole attachment  Synthes 1.8 mm cerclage cables    Tourniquet time:   None      Complications:   No acute complications were encountered.  Patient was transferred to PACU in stable condition    Operative findings:  Patient's  none displaced long oblique fracture fragment which was seen on the CT had displaced by the time the subvastus approach was completed.  The patient had significant displacement of the lesser trochanter which was anchored proximally by the previously placed cerclage cable.  After completing the subvastus approach we were able to anatomically reduce the long oblique fracture line with traction and a collinear clamp and then secured it with a cerclage cable.  We then through digital palpation remove the soft tissue entrapped underneath the lesser trochanter fracture fragment and cerclage that fragment as well to reduce it back to the shaft in the posterior lateral femur.  We then plated the femur in its entirety to prevent stress risers and subsequent repeat periprosthetic fractures.  The distal end of the plate was inserted using a minimally invasive approach and separate incision.  The femur and implant were stable after fixation    Procedure and Technique:  Patient is an 88-year-old female that was seen and examined the preoperative holding area.  The operative extremities marked.  All patient's questions were answered.  The patient was then taken back to the operating room where general endotracheal anesthesia was administered by department anesthesia.  The patient was then transferred over the operating table using CT LS spine precautions.  All bony prominences were well-padded.  The patient was placed in a lateral decubitus position.  The patient's right lower extremity was then prepped and draped in a standard sterile orthopedic fashion.  A timeout was performed confirm correct site, correct patient, correct procedure.  All were in agreement procedure was started.  The patient's previous incision was marked out and the level of the trochanter was marked out.  A longitudinal incision was marked out on the lateral aspect of the femur.  A incision was then made from just proximal to the trochanter down the  posterior lateral aspect of the femoral shaft.  Sharp dissection was carried down to skin subcutaneous tissue.  Electrocautery was used to obtain hemostasis.  Dissection was carried down to level the iliotibial band.  Fascial flaps were raised to allow for visualization for later closure.  The iliotibial band was then split in line with its fibers throughout the entirety of the incision up to the level of the trochanter.  The vastus was then identified and the vastus ridge palpated.  A L type incision was then made over the vastus lateralis leaving a cuff of tissue for later repair the vastus lateralis was then released off of the posterior intermuscular septum while cauterizing perforating branches this was carried through the entirety of the incision.  The patient was noted to have displacement of the long oblique fracture fragment.  This was addressed first.  Traction was applied to the limb and the limits rotational profile was corrected.  A collinear clamp was then placed along the lateral aspect of the femur and compressed to gain compression at the fracture site.  While this was being held a cerclage cable was passed around the femur and tensioned.  The collinear clamp was then released and the fracture reduction was maintained.  Attention attention was then turned to the lesser trochanter fracture.  The lesser trochanter was anchored by the proximal cerclage cable so in order to gain control of the distal aspect of the fracture we were able to palpate the apex of this fracture over the anterior aspect of the femur.  Soft tissue was removed from that was interposed between the lesser trochanter and the femur.  A cerclage wire was then passed around this fragment and while holding tension and this fracture reduced the cerclage cable was tightened.  This allowed for reduction of the femur main fracture fragment and the lesser fragment.  A 10 hole periprosthetic plate was then selected and a 4-hole attachment  was placed onto it.  The plate was then slid submuscularly in between the minimally invasive approach distally at the level of the knee and the main incision.  The tines of the claw clamp were then impacted into the greater trochanter.  The plate was aligned distally on the distal fracture fragment.  The plate did require some contouring in between the offset holes and the plate extender to allow for better fit on the femur.  Once this was done the plate was secured with a 4.5 mm cortical screw just distal to the stem of the fracture hip.  It was then secured distally with multiple 3.5 mm cortical screws across the distal femur.  This allowed for protection of the entirety of the femur.  It was then further secured with locking screw for hybrid fixation.  The proximal end of the femur was then addressed.  Multiple 3.5 mm locking screws and 5 mm locking screws were inserted into the proximal femur around the implant to gain appropriate fixation.  Bicortical cortical screws were allowed around the fracture stem.  Final reduction and implant placement were then confirmed under multiplanar fluoroscopic imaging.  The femur was brought through range of motion and stable.  No instability of the hip replacement was noted.  The wounds were then copiously irrigated with high flow low-pressure irrigation solution.  The 15 Ukrainian drain was then placed underneath the vastus lateralis.  8 cc of Surgiflo were also placed over the posterior intermuscular septum for hemostasis.  A 0 PDS was then used to repair the vastus lateralis back over the vastus lateralis ridge.  The iliotibial band was then repaired using 0 PDS suture.  The subcutaneous tissues were repaired using 2-0 Monocryl suture and the skin was closed with staples.  The wounds were then dressed in Adaptic 4 x 4's and Tegaderms.  The patient was then transferred off the operating table using CT LS spine precautions extubated transferred to PACU in stable  condition        Postoperative plan:  Patient will be weightbearing as tolerated to the right lower extremity.  Patient will receive 24 hours of postoperative antibiotics for infection prophylaxis.  The patient will receive Lovenox for 4 weeks for DVT prophylaxis.  The patient will need to follow-up in the office in 2 weeks time for repeat x-ray evaluation and removal of staples    SIGNATURE: Wilbur Evangelista DO  DATE: February 1, 2024  TIME: 3:12 PM

## 2024-02-01 NOTE — DISCHARGE INSTR - AVS FIRST PAGE
Discharge Instructions - Orthopedics  Jefry Mcclure 88 y.o. female MRN: [id number]  Unit/Bed#: AN OR MAIN    Weight Bearing Status:                                           Patient will be weightbearing as tolerated to right lower extremity  Range of motion as tolerated to right lower extremity    DVT prophylaxis:  Patient to continue Lovenox x 4 weeks upon discharge    Pain:  Continue analgesics as directed    Dressing Instructions:   Please keep clean, dry and intact until follow up     Appt Instructions:   If you do not have your appointment, please call the clinic at [phone] to schedule with Dr. Evangelista  Otherwise follow up as scheduled.    Contact the office sooner if you experience any increased numbness/tingling in the extremities.

## 2024-02-01 NOTE — PHYSICAL THERAPY NOTE
PHYSICAL THERAPY NOTE    Patient Name: Jefry Mcclure  Today's Date: 2/1/2024 02/01/24 0941   Note Type   Note type Cancelled Session   Cancel Reasons Medical status   Additional Comments Chart reviewed including ortho note. Pt scheduled for surgical fixation of the right femur today. Would approeciate reorders for PT post op with activity and WB recommendations.      Bandar Yanes, PT

## 2024-02-01 NOTE — ANESTHESIA PROCEDURE NOTES
Peripheral Block    Patient location during procedure: holding area  Start time: 2/1/2024 11:19 AM  Reason for block: at surgeon's request and post-op pain management  Staffing  Performed by: Tobin Kaplan MD  Authorized by: Tobin Kaplan MD    Preanesthetic Checklist  Completed: patient identified, IV checked, site marked, risks and benefits discussed, surgical consent, monitors and equipment checked, pre-op evaluation and timeout performed  Peripheral Block  Patient position: supine  Prep: ChloraPrep  Patient monitoring: continuous pulse ox and frequent blood pressure checks  Anesthesia block type: Lateral Femoral Cutaneous.  Laterality: right  Injection technique: single-shot  Procedures: ultrasound guided, Ultrasound guidance required for the procedure to increase accuracy and safety of medication placement and decrease risk of complications.  Ultrasound permanent image savedbupivacaine (PF) (MARCAINE) 0.25 % injection 20 mL - Perineural   5 mL - 2/1/2024 11:19:00 AM  Needle  Needle type: Stimuplex   Needle gauge: 20 G  Needle length: 4 in  Needle localization: ultrasound guidance  Assessment  Injection assessment: incremental injection and local visualized surrounding nerve on ultrasound  Paresthesia pain: none  patient tolerated the procedure well with no immediate complications  Additional Notes  Uncomplicated LFCN Block  Nerve identified along lateral edge of sartorius muscle

## 2024-02-01 NOTE — ANESTHESIA PREPROCEDURE EVALUATION
Procedure:  OPEN REDUCTION W/ INTERNAL FIXATION (ORIF) FEMUR, and all associated procedures (Right: Leg Upper)    Relevant Problems   No relevant active problems      #Dementia  #Hypothyroidism    CT-LE (01/2024):  IMPRESSION:     Acute mildly displaced intertrochanteric/subtrochanteric periprosthetic femur fracture.    Physical Exam    Airway    Mallampati score: I  TM Distance: >3 FB  Neck ROM: full     Dental   No notable dental hx     Cardiovascular  Rhythm: regular, Rate: normal    Pulmonary   Breath sounds clear to auscultation    Other Findings  Intercisor Distance > 3cm    post-pubertal.      Anesthesia Plan  ASA Score- 2     Anesthesia Type- general with ASA Monitors.         Additional Monitors:     Airway Plan: ETT.    Comment: Discussed benefits/risks of general anesthesia including possibility of mouth/throat pain, injury to lips/teeth, nausea/vomiting, and surgical pain along with more rare complications such as stroke, MI, pneumonia, aspiration, and injury to blood vessels. All questions answered.    Discussed nerve block to assist with post-operative analgesia. Discussed possibility of uncommon complications including permanent nerve injury, damage to blood vessels, infection local anesthetic toxicity, and nerve block failure. Patient understands and wishes to proceed.       .       Plan Factors-Exercise tolerance (METS): >4 METS.    Chart reviewed. EKG reviewed.  Existing labs reviewed.                   Induction- intravenous.    Postoperative Plan- Plan for postoperative opioid use. Planned trial extubation    Informed Consent- Anesthetic plan and risks discussed with son and healthcare power of .  I personally reviewed this patient with the CRNA. Discussed and agreed on the Anesthesia Plan with the CRNA..

## 2024-02-01 NOTE — PROGRESS NOTES
Orthopedics   Jefry Mcclure 88 y.o. female MRN: [id number]  Unit/Bed#: W -01        Subjective:  88 y.o. female seen and evaluated this morning. Notes right hip pain well controlled at this time. Denies any chest pain or shortness of breath, no paresthesia in the lower extremities.         Labs:  0   Lab Value Date/Time    HCT 33.5 (L) 02/01/2024 0457    HCT 37.7 01/31/2024 1352    HCT 44.1 10/05/2022 1050    HGB 11.2 (L) 02/01/2024 0457    HGB 12.4 01/31/2024 1352    HGB 14.8 10/05/2022 1050    INR 1.09 01/31/2024 1623    WBC 7.34 02/01/2024 0457    WBC 10.56 (H) 01/31/2024 1352    WBC 8.82 10/05/2022 1050       Meds:    Current Facility-Administered Medications:     acetaminophen (TYLENOL) tablet 975 mg, 975 mg, Oral, Q8H MYNOR, Ron Oakes PA-C, 975 mg at 02/01/24 0551    ceftriaxone (ROCEPHIN) 1 g/50 mL in dextrose IVPB, 1,000 mg, Intravenous, Q24H, MELISA Carter, Last Rate: 100 mL/hr at 02/01/24 0937, 1,000 mg at 02/01/24 0937    donepezil (ARICEPT) tablet 10 mg, 10 mg, Oral, HS, Ron Oakes PA-C    enoxaparin (LOVENOX) subcutaneous injection 30 mg, 30 mg, Subcutaneous, Q12H MYNOR, Ron Oakes PA-C, 30 mg at 01/31/24 2217    gabapentin (NEURONTIN) capsule 100 mg, 100 mg, Oral, HS, YFN Mckeon-JERZY, 100 mg at 01/31/24 2217    HYDROmorphone HCl (DILAUDID) injection 0.2 mg, 0.2 mg, Intravenous, Q2H PRN, Ron Oakes PA-C    levothyroxine tablet 25 mcg, 25 mcg, Oral, Early Morning, Ron Oakes PA-C, 25 mcg at 02/01/24 0552    lidocaine (LIDODERM) 5 % patch 1 patch, 1 patch, Topical, Daily, Ron Oakes PA-C, 1 patch at 02/01/24 0936    naloxone (NARCAN) 0.04 mg/mL syringe 0.04 mg, 0.04 mg, Intravenous, Q1MIN PRN, Ron Oakes PA-C    oxyCODONE (ROXICODONE) split tablet 2.5 mg, 2.5 mg, Oral, Q4H PRN **OR** oxyCODONE (ROXICODONE) IR tablet 5 mg, 5 mg, Oral, Q4H PRN, Ron Oakes PA-C    polyethylene glycol (MIRALAX) packet 17 g, 17 g, Oral, Daily, Ron Oakes PA-C     "senna-docusate sodium (SENOKOT S) 8.6-50 mg per tablet 1 tablet, 1 tablet, Oral, HS, Ronesthela Oakes PA-C, 1 tablet at 01/31/24 2148    Blood Culture:   No results found for: \"BLOODCX\"    Wound Culture:   No results found for: \"WOUNDCULT\"    Ins and Outs:  I/O last 24 hours:  In: -   Out: 80 [Urine:80]          Physical Exam:   /77 (BP Location: Right arm)   Pulse 89   Temp 98.1 °F (36.7 °C) (Oral)   Resp 18   Ht 5' 4\" (1.626 m)   Wt 75.8 kg (167 lb 1.7 oz)   SpO2 94%   BMI 28.68 kg/m²   Gen: No acute distress, resting comfortably in bed  HEENT: Eyes clear, moist mucus membranes, hearing intact  Respiratory: No audible wheezing or stridor  Cardiovascular: Well Perfused peripherally, 2+ distal pulse  Abdomen: nondistended, no peritoneal signs    Musculoskeletal: right  lower extremity  Skin intact. Well healed incisions posterior lateral thighs and over anterior knees bilaterally.   +ttp over the right lateral hip and mid thigh.   SILT s/s/sp/dp/t. Bilaterally   +ankle dorsi/plantar flexion, EHL/FHL bilaterally.   2+ DP pulse bilaterally.   Leg lengths equal      _*_*_*_*_*_*_*_*_*_*_*_*_*_*_*_*_*_*_*_*_*_*_*_*_*_*_*_*_*_*_*_*_*_*_*_*_*_*_*_*_*    Assessment:  88 y.o.female s/p fall with acute mildly displaced peritrochanteric periprosthetic femur fracture.     Plan:     Will plan for operative intervention 2/1/2024  NPO   Pre operative abx ordered  Intra operative txa ordered.   Consent obtained  Pain control per primary team.   DVT ppx per primary team.   Medical management per primary team.   Dispo: Ortho will follow  Case reviewed and discussed with Dr. Evangelista.     Pino Deng PA-C    "

## 2024-02-01 NOTE — PROGRESS NOTES
[facility]  Progress Note  Name: Jefry Mcclure I  MRN: [id number]  Unit/Bed#: OR POOL I Date of Admission: 1/31/2024   Date of Service: 2/1/2024 I Hospital Day: 1    Assessment/Plan   Fall  Assessment & Plan  Status post fall with the below noted injuries.  Fall precautions.  Geriatric Medicine consultation for evaluation, medication review and recommendations.  PT and OT evaluation and treatment as indicated.  Case Management consultation for disposition planning.      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  Appreciate Orthopedic surgery evaluation, recommendations and interventions as noted.  2/1--planning of OR today  Maintain non weightbearing status on the right lower extremity.  Monitor right lower extremity neurovascular exam.  Continue multimodal analgesic regimen.  Continue DVT prophylaxis.  PT and OT evaluation and treatment as indicated.  Outpatient follow up with Orthopedic surgery for re-evaluation.      UTI (urinary tract infection)  Assessment & Plan  POA--admission UA positive for Nitrates and WBCs  Treat with 3 day course of rocephin.   Continue to monitor.     History of dementia  Assessment & Plan  Continue current medication regimen.  Outpatient follow-up with PCP.  Geriatrics consulted    History of hypothyroidism  Assessment & Plan  Continue current medication regimen.  Outpatient follow-up with PCP.      Acute pain due to trauma  Assessment & Plan  Acute pain secondary to traumatic injuries.  Continue multimodal analgesic regimen.  Bowel regimen as long as using opioids.  Continue to monitor pain and adjust regimen as indicated.               TRAUMA TERTIARY SURVEY NOTE    VTE Prophylaxis:Sequential compression device (Venodyne)  and Enoxaparin (Lovenox)     Disposition: Pending surgery today    Code status:  Level 3 - DNAR and DNI    Consultants: IP CONSULT  "TO TRAUMA SURGERY  IP CONSULT TO ORTHOPEDIC SURGERY  IP CONSULT TO ORTHOPEDIC SURGERY  IP CONSULT TO CASE MANAGEMENT    Subjective     Mechanism of Injury:Fall     Chief Complaint: \"I Fell\"     HPI/Last 24 hour events: This is an 88-year-old female who describes suffering a fall.  She is unable to provide any details regarding the fall.  Though she does note having right hip pain since the event.  She does understand that she did fracture her hip and she is going to require surgery later today.  She denies any other pain or discomfort.  ROS is limited due to dementia.  Son, Tavo, was called and updated to his satisfaction.     Objective   Vitals:   Temp:  [98.1 °F (36.7 °C)] 98.1 °F (36.7 °C)  HR:  [89-97] 89  Resp:  [18] 18  BP: (120-143)/(63-77) 125/77    I/O         01/30 0701  01/31 0700 01/31 0701  02/01 0700 02/01 0701 02/02 0700    Urine (mL/kg/hr)  80     Total Output  80     Net  -80                     Physical Exam:   GENERAL APPEARANCE: NAD  NEURO: GCS- 14 due to confusion  HEENT: NC/AT, neck supple  CV: RRR, +2 radial and DP pulses  LUNGS: CTA, BL. Chest wall is non tender  GI: soft, Nontender  : pelvis is stable  MSK: Limited range of motion of right hip.  Mild tenderness on palpation.  Patient is able to fully range bilateral feet and ankles.  Upper extremities display no deformities and are nontender.  No C, T, L-spine tenderness.  No palpable step-offs.  SKIN: Warm, dry.    Invasive Devices       Peripheral Intravenous Line  Duration             Peripheral IV 01/31/24 Left Antecubital <1 day                          Lab Results: Results: I have personally reviewed all pertinent laboratory/tests results, BMP/CMP:   Lab Results   Component Value Date    SODIUM 140 02/01/2024    K 3.7 02/01/2024     (H) 02/01/2024    CO2 25 02/01/2024    BUN 15 02/01/2024    CREATININE 0.49 (L) 02/01/2024    CALCIUM 8.2 (L) 02/01/2024    EGFR 87 02/01/2024   , and CBC:   Lab Results   Component Value Date    " WBC 7.34 02/01/2024    HGB 11.2 (L) 02/01/2024    HCT 33.5 (L) 02/01/2024    MCV 93 02/01/2024     02/01/2024    RBC 3.60 (L) 02/01/2024    MCH 31.1 02/01/2024    MCHC 33.4 02/01/2024    RDW 14.2 02/01/2024    MPV 12.6 02/01/2024    NRBC 0 02/01/2024       Imaging Results: I have personally reviewed pertinent reports.    Chest Xray(s): negative for acute findings   FAST exam(s): negative for acute findings   CT Scan(s): positive for acute findings: right hip fracture   Additional Xray(s): positive for acute findings: right hip fracture     Other Studies: none

## 2024-02-01 NOTE — PLAN OF CARE
Problem: Nutrition/Hydration-ADULT  Goal: Nutrient/Hydration intake appropriate for improving, restoring or maintaining nutritional needs  Description: Monitor and assess patient's nutrition/hydration status for malnutrition. Collaborate with interdisciplinary team and initiate plan and interventions as ordered.  Monitor patient's weight and dietary intake as ordered or per policy. Utilize nutrition screening tool and intervene as necessary. Determine patient's food preferences and provide high-protein, high-caloric foods as appropriate.     INTERVENTIONS:  - Monitor oral intake, urinary output, labs, and treatment plans  - Assess nutrition and hydration status and recommend course of action  - Evaluate amount of meals eaten  - Assist patient with eating if necessary   - Allow adequate time for meals  - Recommend/ encourage appropriate diets, oral nutritional supplements, and vitamin/mineral supplements  - Order, calculate, and assess calorie counts as needed  - Recommend, monitor, and adjust tube feedings and TPN/PPN based on assessed needs  - Assess need for intravenous fluids  - Provide specific nutrition/hydration education as appropriate  - Include patient/family/caregiver in decisions related to nutrition  2/1/2024 1329 by Amaris White  Outcome: Progressing  2/1/2024 1329 by Amaris White  Outcome: Progressing     Problem: PAIN - ADULT  Goal: Verbalizes/displays adequate comfort level or baseline comfort level  Description: Interventions:  - Encourage patient to monitor pain and request assistance  - Assess pain using appropriate pain scale  - Administer analgesics based on type and severity of pain and evaluate response  - Implement non-pharmacological measures as appropriate and evaluate response  - Consider cultural and social influences on pain and pain management  - Notify physician/advanced practitioner if interventions unsuccessful or patient reports new pain  Outcome: Progressing     Problem:  INFECTION - ADULT  Goal: Absence or prevention of progression during hospitalization  Description: INTERVENTIONS:  - Assess and monitor for signs and symptoms of infection  - Monitor lab/diagnostic results  - Monitor all insertion sites, i.e. indwelling lines, tubes, and drains  - Monitor endotracheal if appropriate and nasal secretions for changes in amount and color  - [place] appropriate cooling/warming therapies per order  - Administer medications as ordered  - Instruct and encourage patient and family to use good hand hygiene technique  - Identify and instruct in appropriate isolation precautions for identified infection/condition  Outcome: Progressing  Goal: Absence of fever/infection during neutropenic period  Description: INTERVENTIONS:  - Monitor WBC    Outcome: Progressing     Problem: SAFETY ADULT  Goal: Patient will remain free of falls  Description: INTERVENTIONS:  - Educate patient/family on patient safety including physical limitations  - Instruct patient to call for assistance with activity   - Consult OT/PT to assist with strengthening/mobility   - Keep Call bell within reach  - Keep bed low and locked with side rails adjusted as appropriate  - Keep care items and personal belongings within reach  - Initiate and maintain comfort rounds  - Make Fall Risk Sign visible to staff  - Offer Toileting every  Hours, in advance of need  - Initiate/Maintain alarm  - Obtain necessary fall risk management equipment:   - Apply yellow socks and bracelet for high fall risk patients  - Consider moving patient to room near nurses station  Outcome: Progressing  Goal: Maintain or return to baseline ADL function  Description: INTERVENTIONS:  -  Assess patient's ability to carry out ADLs; assess patient's baseline for ADL function and identify physical deficits which impact ability to perform ADLs (bathing, care of mouth/teeth, toileting, grooming, dressing, etc.)  - Assess/evaluate cause of self-care deficits   -  Assess range of motion  - Assess patient's mobility; develop plan if impaired  - Assess patient's need for assistive devices and provide as appropriate  - Encourage maximum independence but intervene and supervise when necessary  - Involve family in performance of ADLs  - Assess for home care needs following discharge   - Consider OT consult to assist with ADL evaluation and planning for discharge  - Provide patient education as appropriate  Outcome: Progressing  Goal: Maintains/Returns to pre admission functional level  Description: INTERVENTIONS:  - Perform AM-PAC 6 Click Basic Mobility/ Daily Activity assessment daily.  - Set and communicate daily mobility goal to care team and patient/family/caregiver.   - Collaborate with rehabilitation services on mobility goals if consulted  - Perform Range of Motion  times a day.  - Reposition patient every  hours.  - Dangle patient  times a day  - Stand patient  times a day  - Ambulate patient  times a day  - Out of bed to chair  times a day   - Out of bed for meals  times a day  - Out of bed for toileting  - Record patient progress and toleration of activity level   Outcome: Progressing     Problem: DISCHARGE PLANNING  Goal: Discharge to home or other facility with appropriate resources  Description: INTERVENTIONS:  - Identify barriers to discharge w/patient and caregiver  - Arrange for needed discharge resources and transportation as appropriate  - Identify discharge learning needs (meds, wound care, etc.)  - Arrange for interpretive services to assist at discharge as needed  - Refer to Case Management Department for coordinating discharge planning if the patient needs post-hospital services based on physician/advanced practitioner order or complex needs related to functional status, cognitive ability, or social support system  Outcome: Progressing     Problem: Knowledge Deficit  Goal: Patient/family/caregiver demonstrates understanding of disease process, treatment plan,  medications, and discharge instructions  Description: Complete learning assessment and assess knowledge base.  Interventions:  - Provide teaching at level of understanding  - Provide teaching via preferred learning methods  Outcome: Progressing

## 2024-02-01 NOTE — ANESTHESIA PROCEDURE NOTES
Peripheral Block    Patient location during procedure: pre-op  Start time: 2/1/2024 11:19 AM  Reason for block: at surgeon's request and post-op pain management  Staffing  Performed by: Tobin Kaplan MD  Authorized by: Tobin Kaplan MD    Preanesthetic Checklist  Completed: patient identified, IV checked, site marked, risks and benefits discussed, surgical consent, monitors and equipment checked, pre-op evaluation and timeout performed  Peripheral Block  Patient position: supine  Prep: ChloraPrep  Patient monitoring: continuous pulse ox, frequent blood pressure checks and heart rate  Block type: femoral  Laterality: right  Injection technique: single-shot  Procedures: ultrasound guided, Ultrasound guidance required for the procedure to increase accuracy and safety of medication placement and decrease risk of complications.  Ultrasound permanent image savedbupivacaine (PF) (MARCAINE) 0.5 % injection 20 mL - Perineural   20 mL - 2/1/2024 11:19:00 AM  Needle  Needle type: Stimuplex   Needle gauge: 20 G  Needle length: 4 in  Needle localization: ultrasound guidance  Assessment  Injection assessment: incremental injection, local visualized surrounding nerve on ultrasound, negative aspiration for heme and no paresthesia on injection  Paresthesia pain: none  patient tolerated the procedure well with no immediate complications  Additional Notes  Uncomplicated femoral nerve block  Iliopsoas muscle and nerve identified below fascia iliaca lateral to femoral artery

## 2024-02-01 NOTE — ANESTHESIA POSTPROCEDURE EVALUATION
Post-Op Assessment Note    CV Status:  Stable  Pain Score: 0    Pain management: adequate    Multimodal analgesia used between 6 hours prior to anesthesia start to PACU discharge    Mental Status:  Alert and awake   Hydration Status:  Euvolemic   PONV Controlled:  None   Airway Patency:  Patent  Airway: intubated  Two or more mitigation strategies used for obstructive sleep apnea   Post Op Vitals Reviewed: Yes    No anethesia notable event occurred.    Staff: Anesthesiologist, CRNA               BP   151/70   Temp   97   Pulse  94   Resp   16   SpO2   100

## 2024-02-01 NOTE — ASSESSMENT & PLAN NOTE
POA--admission UA positive for Nitrates and WBCs  Treat with 3 day course of rocephin.   Continue to monitor.

## 2024-02-01 NOTE — OCCUPATIONAL THERAPY NOTE
Occupational Therapy Cancellation Note        Patient Name: Jefry Mcclure  Today's Date: 2/1/2024 02/01/24 1104   Note Type   Note type Cancelled Session   Cancel Reasons Patient to operating room   Additional Comments OT orders received, chart reviewed. Pt to OR at this time for sx fixation of acute mildly displaced peritrochanteric periprosthetic femur fracture. Will f/u post operatively, appreciate updated WBS and activity orders       Citlaly Elliott, OT

## 2024-02-02 LAB
ANION GAP SERPL CALCULATED.3IONS-SCNC: 7 MMOL/L
BASOPHILS # BLD AUTO: 0.02 THOUSANDS/ÂΜL (ref 0–0.1)
BASOPHILS NFR BLD AUTO: 0 % (ref 0–1)
BUN SERPL-MCNC: 19 MG/DL (ref 5–25)
CALCIUM SERPL-MCNC: 8 MG/DL (ref 8.4–10.2)
CHLORIDE SERPL-SCNC: 108 MMOL/L (ref 96–108)
CO2 SERPL-SCNC: 23 MMOL/L (ref 21–32)
CREAT SERPL-MCNC: 0.61 MG/DL (ref 0.6–1.3)
EOSINOPHIL # BLD AUTO: 0 THOUSAND/ÂΜL (ref 0–0.61)
EOSINOPHIL NFR BLD AUTO: 0 % (ref 0–6)
ERYTHROCYTE [DISTWIDTH] IN BLOOD BY AUTOMATED COUNT: 14.2 % (ref 11.6–15.1)
GFR SERPL CREATININE-BSD FRML MDRD: 81 ML/MIN/1.73SQ M
GLUCOSE SERPL-MCNC: 142 MG/DL (ref 65–140)
HCT VFR BLD AUTO: 30 % (ref 34.8–46.1)
HGB BLD-MCNC: 10 G/DL (ref 11.5–15.4)
IMM GRANULOCYTES # BLD AUTO: 0.09 THOUSAND/UL (ref 0–0.2)
IMM GRANULOCYTES NFR BLD AUTO: 1 % (ref 0–2)
LYMPHOCYTES # BLD AUTO: 0.71 THOUSANDS/ÂΜL (ref 0.6–4.47)
LYMPHOCYTES NFR BLD AUTO: 6 % (ref 14–44)
MCH RBC QN AUTO: 31.5 PG (ref 26.8–34.3)
MCHC RBC AUTO-ENTMCNC: 33.3 G/DL (ref 31.4–37.4)
MCV RBC AUTO: 95 FL (ref 82–98)
MONOCYTES # BLD AUTO: 1.15 THOUSAND/ÂΜL (ref 0.17–1.22)
MONOCYTES NFR BLD AUTO: 9 % (ref 4–12)
NEUTROPHILS # BLD AUTO: 10.82 THOUSANDS/ÂΜL (ref 1.85–7.62)
NEUTS SEG NFR BLD AUTO: 84 % (ref 43–75)
NRBC BLD AUTO-RTO: 0 /100 WBCS
PLATELET # BLD AUTO: 160 THOUSANDS/UL (ref 149–390)
PMV BLD AUTO: 12.2 FL (ref 8.9–12.7)
POTASSIUM SERPL-SCNC: 4.6 MMOL/L (ref 3.5–5.3)
RBC # BLD AUTO: 3.17 MILLION/UL (ref 3.81–5.12)
SODIUM SERPL-SCNC: 138 MMOL/L (ref 135–147)
WBC # BLD AUTO: 12.79 THOUSAND/UL (ref 4.31–10.16)

## 2024-02-02 PROCEDURE — 99232 SBSQ HOSP IP/OBS MODERATE 35: CPT | Performed by: SURGERY

## 2024-02-02 PROCEDURE — 99232 SBSQ HOSP IP/OBS MODERATE 35: CPT

## 2024-02-02 PROCEDURE — 85025 COMPLETE CBC W/AUTO DIFF WBC: CPT | Performed by: NURSE PRACTITIONER

## 2024-02-02 PROCEDURE — 80048 BASIC METABOLIC PNL TOTAL CA: CPT | Performed by: NURSE PRACTITIONER

## 2024-02-02 PROCEDURE — NC001 PR NO CHARGE: Performed by: STUDENT IN AN ORGANIZED HEALTH CARE EDUCATION/TRAINING PROGRAM

## 2024-02-02 PROCEDURE — 97163 PT EVAL HIGH COMPLEX 45 MIN: CPT

## 2024-02-02 PROCEDURE — 97167 OT EVAL HIGH COMPLEX 60 MIN: CPT

## 2024-02-02 PROCEDURE — 97530 THERAPEUTIC ACTIVITIES: CPT

## 2024-02-02 PROCEDURE — 99223 1ST HOSP IP/OBS HIGH 75: CPT | Performed by: NURSE PRACTITIONER

## 2024-02-02 PROCEDURE — 99024 POSTOP FOLLOW-UP VISIT: CPT | Performed by: PHYSICIAN ASSISTANT

## 2024-02-02 RX ADMIN — Medication 2.5 MG: at 12:50

## 2024-02-02 RX ADMIN — ACETAMINOPHEN 975 MG: 325 TABLET, FILM COATED ORAL at 05:32

## 2024-02-02 RX ADMIN — POLYETHYLENE GLYCOL 3350 17 G: 17 POWDER, FOR SOLUTION ORAL at 08:37

## 2024-02-02 RX ADMIN — DONEPEZIL HYDROCHLORIDE 10 MG: 5 TABLET ORAL at 21:46

## 2024-02-02 RX ADMIN — ACETAMINOPHEN 975 MG: 325 TABLET, FILM COATED ORAL at 13:23

## 2024-02-02 RX ADMIN — Medication 2.5 MG: at 21:48

## 2024-02-02 RX ADMIN — LEVOTHYROXINE SODIUM 25 MCG: 25 TABLET ORAL at 05:32

## 2024-02-02 RX ADMIN — ACETAMINOPHEN 975 MG: 325 TABLET, FILM COATED ORAL at 21:46

## 2024-02-02 RX ADMIN — CEFTRIAXONE SODIUM 1000 MG: 10 INJECTION, POWDER, FOR SOLUTION INTRAVENOUS at 09:14

## 2024-02-02 RX ADMIN — CEFAZOLIN SODIUM 2000 MG: 2 SOLUTION INTRAVENOUS at 05:32

## 2024-02-02 RX ADMIN — ENOXAPARIN SODIUM 30 MG: 30 INJECTION SUBCUTANEOUS at 21:46

## 2024-02-02 RX ADMIN — ENOXAPARIN SODIUM 30 MG: 30 INJECTION SUBCUTANEOUS at 08:37

## 2024-02-02 RX ADMIN — LIDOCAINE 5% 1 PATCH: 700 PATCH TOPICAL at 08:37

## 2024-02-02 RX ADMIN — GABAPENTIN 100 MG: 100 CAPSULE ORAL at 21:46

## 2024-02-02 RX ADMIN — OXYCODONE HYDROCHLORIDE 5 MG: 5 TABLET ORAL at 08:37

## 2024-02-02 RX ADMIN — SENNOSIDES, DOCUSATE SODIUM 1 TABLET: 8.6; 5 TABLET ORAL at 21:46

## 2024-02-02 NOTE — CONSULTS
Consultation - Geriatric Medicine   Jefry Mcclure 88 y.o. female MRN: [id number]  Unit/Bed#: W -01 Encounter: [id number]      Assessment/Plan   Fall  Fall at facility  [place] fall precautions   Assess patient frequently for physical needs, encourage use of assistant devices as needed and directed by PT/OT  Identify cognitive and physical deficits and behaviors that affect risk of falls  Consider moving patient closer to nursing station to monitor more closely for impulsive behavior which may increase risk of falls  Educate patient/family on patient safety including physical limitations and importance of using call bell for assistance   Modify environment to reduce risk of injury including disconnecting from pole when not in use, ensuring adequate lighting in room and restroom, ensuring that path to restroom is clear and free of trip hazards  PT/OT consulted to assist with strengthening/mobility and assist with discharge planning to appropriate level of care    Periprosthetic fracture around internal prosthetic hip joint  S/p fall  Orthopedics was consulted  S/p open reduction internal fixation of right paresthetic femur fracture  WBAT to RLE  Drain in place  PT/OT consulted  Multimodal pain regimen including geriatric pain protocol- APS following for pain management  Management per primary    UTI  UA showed moderate leukocytes positive nitrate and and bacteria  Patient being treated with a 3-day course of Rocephin    Dementia  Patient is alert oriented to person, wife, situation, and partial time-knew it was February 2024, was unclear of date and that it was a Tuesday  Patient managed on donepezil 10 mg nightly  Most recent TSH and vitamin D levels stable  Recommend checking vitamin B12 level  No cognitive screening on file-file the patient resides at [facility] and unable to see those records  At risk for delirium due to hospitalization, medications, dementia, acute pain  Recommend delirium  precautions  Maintain sleep-wake cycle, avoid nighttime interruptions  minimize overnight interruptions, group overnight vitals/labs/nursing checks as possible  dim lights, close blinds and turn off tv to minimize stimulation and encourage sleep environment in evenings  Provide adequate pain control  Avoid urinary retention and constipation  Provide frequent and early mobilization  Provide frequent redirection and reorientation as needed  Avoid medications that may worsen or precipitate delirium such as tramadol, benzodiazepines, anticholinergics, and Benadryl  Redirect unwanted behaviors as first-line therapy, avoid physical restraints as able to  Continue to monitor    Hypothyroidism  Most recent TSH 4.76  Currently on levothyroxine 25 mcg daily  Follow-up outpatient PCP    Constipation  Patient complains of constipation  Last BM was prior to hospitalization  Is currently on Senokot S1 tablet nightly and MiraLAX daily  Recommend increasing the Senokot to twice daily  Encourage adequate p.o. Hydration  Encourage prune juice as tolerated    Anxiety/depression  Patient has a diagnosis listed on her paperwork from [facility]  Currently not on any medications for anxiety or depression  [facility]  Continue to provide emotional support    Insomnia  First-line treatment is behavior modification  Maintain sleep-wake cycle, avoid nighttime interruptions  Avoid caffeine throughout the day  Avoid napping throughout the day  Encourage physical activity throughout the day  Avoid sedative hypnotics including benzodiazepines and benadryl  Patient reports she usually sleeps well  If needed use melatonin 3 mg nightly    Vision impairment  Patient does have vision impairment  Wears glasses at baseline   Recommend use of corrective lenses at all appropriate times  Encourage adequate lighting and encourage use of assistance with ambulation  Keep personal belongings close to  avoid reaching  Encourage appropriate footwear at all times  Recommend large font for printed materials provided to patient    Hard of hearing  Patient reports some mild hearing impairment  Does not wear hearing aids  Hearing impairment  correlated with depression, cognitive impairment, delirium and falls in the older adult  Speak clearly  Use sound amplifier  Speak face to face  Use clear dictation and enunciation of words    Cough  Patient reporting a bothersome cough  Stated she had a cold prior to hospitalization and this cough will go away  Kent text MELISA Cabezas with trauma and made him aware  Patient was asking for something for her cough  Will defer to trauma    Frailty  Clinical Frail Scale: 6 living with moderate frailty  Encourage adequate hydration and nutrition, including protein in meals  OOB as tolerated  PT/OT consulted  Encourage early and frequent mobilization    Ambulatory dysfunction  At a baseline ambulates with rollator  PT/OT following  Fall precautions  Out of bed as tolerated  Encourage early and frequent mobilization  Encourage adequate hydration and nutrition  Provide adequate pain management   Goal is to return to [facility]  Continue with PT/OT for continued strength and balance training     Home medication review  Acetaminophen 650 mg nightly at bedtime for knee pain  Acetaminophen 600 mg every 6 as needed for pain or fever  Bengay lidocaine cream applied to bilateral knees topically  Calcium carbonate vitamin D 600/200 mg/units give 1 tablet by mouth twice daily  Donepezil 10 mg nightly  Multivitamin daily  Levothyroxine 25 mcg daily in the morning  Tramadol 50 mg every 6 as needed for moderate or severe pain  Vitamin D3 50,000 units give 1 tablet by mouth every 30 days    Personally confirmed with medication list from [facility]    History of Present Illness   Physician Requesting Consult: Bert Shaw,   Reason for Consult / Principal Problem:  Fall  Hx and PE limited by: Dementia  Additional history obtained from: Chart review and patient evaluation      HPI: Jefry Mcclure is a 88 y.o. year old female who presents with history of hypothyroidism, dementia, and ambulatory dysfunction.  She presented to ER after a fall at [facility].  Trauma workup revealed periprosthetic fracture of right femur.    Patient lives at [facility].  She has been there a little over a year.  She uses a rollator for ambulation.  She has had 2 falls in the last 6 months.  She requires assistance for ADLs and IADLs.  The facility helps her with bathing and dressing.  They also manage the cooking, cleaning, and medications.  Her son manages the finances.  She reports she is continent of both bowel and bladder.  She wears eyeglasses, no dentures or hearing aids.  Appetite has been stable, no recent weight loss.  Denies any significant anxiety or depression, reports occasionally gets anxious due to thinking a lot.  She does not drive.  She denies any significant issues with memory.    Upon examination patient was out of bed to chair, resting.  She appeared comfortable and was in no acute distress.  She denied any significant pain on exam in her leg.  She slept well overnight.  Appetite has been stable.  She has not yet moved her bowels since hospitalized.  Per nursing no acute concerns or issues at this time.    Inpatient consult to Gerontology  Consult performed by: MELISA Bishop  Consult ordered by: MELISA Carter          Review of Systems   Constitutional:  Negative for activity change, appetite change, chills, fatigue and fever.   HENT:  Negative for sore throat and trouble swallowing.    Eyes:  Negative for visual disturbance.   Respiratory:  Positive for cough. Negative for shortness of breath.    Cardiovascular:  Negative for chest pain and palpitations.   Gastrointestinal:  Positive for constipation. Negative for abdominal pain, diarrhea, nausea and  vomiting.   Genitourinary:  Negative for difficulty urinating, dysuria, frequency and hematuria.   Musculoskeletal:  Positive for arthralgias and gait problem. Negative for back pain.   Skin:  Negative for color change and rash.   Neurological:  Positive for weakness and headaches. Negative for dizziness and light-headedness.   Psychiatric/Behavioral:  Negative for confusion, dysphoric mood and sleep disturbance. The patient is nervous/anxious.        Historical Information   Past Medical History:   Diagnosis Date    Arthritis      Past Surgical History:   Procedure Laterality Date    ADENOIDECTOMY      HIP SURGERY Bilateral     KNEE SURGERY Bilateral     ORIF FEMUR FRACTURE Right 2/1/2024    Procedure: OPEN REDUCTION W/ INTERNAL FIXATION (ORIF) FEMUR, and all associated procedures;  Surgeon: Wilbur Evangelista DO;  Location: [facility];  Service: Orthopedics     Social History   Social History     Substance and Sexual Activity   Alcohol Use Yes     Social History     Substance and Sexual Activity   Drug Use Not Currently     Social History     Tobacco Use   Smoking Status Former   Smokeless Tobacco Never     Family History:   Family History   Problem Relation Age of Onset    Rheum arthritis Mother        Meds/Allergies   all current active meds have been reviewed    No Known Allergies    Objective     Intake/Output Summary (Last 24 hours) at 2/2/2024 1039  Last data filed at 2/2/2024 1014  Gross per 24 hour   Intake 1213.67 ml   Output 150 ml   Net 1063.67 ml     Invasive Devices       Peripheral Intravenous Line  Duration             Peripheral IV 01/31/24 Left Antecubital 1 day    Peripheral IV 02/01/24 Right Hand <1 day              Drain  Duration             Closed/Suction Drain Right Thigh Bulb 15 Fr. <1 day                    Physical Exam  Vitals reviewed.   Constitutional:       General: She is not in acute distress.     Appearance: She is well-developed. She is not ill-appearing.      Comments:  "Frail-appearing   HENT:      Head: Normocephalic and atraumatic.   Cardiovascular:      Rate and Rhythm: Normal rate and regular rhythm.      Heart sounds: No murmur heard.  Pulmonary:      Effort: Pulmonary effort is normal. No respiratory distress.      Breath sounds: Normal breath sounds.   Abdominal:      General: Bowel sounds are normal. There is no distension.      Palpations: Abdomen is soft.      Tenderness: There is no abdominal tenderness.   Musculoskeletal:         General: Tenderness and signs of injury present. No swelling.      Right lower leg: No edema.      Left lower leg: No edema.   Skin:     General: Skin is warm and dry.   Neurological:      General: No focal deficit present.      Mental Status: She is alert. Mental status is at baseline.      Cranial Nerves: No cranial nerve deficit.      Motor: Weakness present.      Gait: Gait abnormal.      Comments: Alert to person, place, situation and partial time   Psychiatric:         Mood and Affect: Mood normal.         Lab Results:   I have personally reviewed pertinent lab results including the following:  -CBC, BMP, UA, phosphorus, magnesium, TSH, vitamin D    I have personally reviewed the following imaging study reports in PACS:  -X-ray femur, CT lower extremity, CT spine, x-ray chest, CT head    Therapies:   PT: Consulted and following  OT: Consulted and following    VTE Prophylaxis: Sequential compression device (Venodyne)  and Enoxaparin (Lovenox)    Code Status: Level 3 - DNAR and DNI  Advance Directive and Living Will:    yes  Power of :  yes  POLST:  yes    Family and Social Support:   Living arrangements: Lives at [facility]  Support system: Son    Goals of Care: Return to [facility]    Please note:  Voice-recognition software may have been used in the preparation of this document.  Occasional wrong word or \"sound-alike\" substitutions may have occurred due to the inherent limitations of voice recognition software.  Interpretation " should be guided by context.

## 2024-02-02 NOTE — ASSESSMENT & PLAN NOTE
Periprosthetic hip fracture of right side, present on admission.  Appreciate Orthopedic surgery evaluation, recommendations and interventions as noted.  2/1--planning of OR today  Maintain non weightbearing status on the right lower extremity.  MISSY drain in place--125ml Output overnight  Monitor right lower extremity neurovascular exam.  Continue multimodal analgesic regimen.  Continue DVT prophylaxis.  PT and OT evaluation and treatment as indicated.  Outpatient follow up with Orthopedic surgery for re-evaluation.

## 2024-02-02 NOTE — CASE MANAGEMENT
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    Readmission Root Cause  30 Day Readmission: No    Patient Information  Admitted from:: Facility  Mental Status: Confused  During Assessment patient was accompanied by: Friend  Assessment information provided by:: Friend (chart review)  Primary Caregiver: Other (Comment)  Caregiver's Name:: Pushpa  Support Systems: Son, Family members, Friend  County of Residence: [place]  What [facility] do you live in?: [place]  Home entry access options. Select all that apply.: No steps to enter home  Type of Current Residence: Facility  Upon entering residence, is there a bedroom on the main floor (no further steps)?: Yes  Upon entering residence, is there a bathroom on the main floor (no further steps)?: Yes  Living Arrangements: Other (Comment) (Lives at facility)    Activities of Daily Living Prior to Admission  Functional Status: Assistance  Completes ADLs independently?: No  Level of ADL dependence:  Assistance  Ambulates independently?: Yes  Does patient use assisted devices?: Yes  Assisted Devices (DME) used: Walker  Does patient currently own DME?: Yes  What DME does the patient currently own?: Walker  Does patient have a history of Outpatient Therapy (PT/OT)?: Yes  Does the patient have a history of Short-Term Rehab?: Yes  Does patient have a history of HHC?: Yes  Does patient currently have HHC?: No    Patient Information Continued  Income Source: Pension/snf  Does patient have prescription coverage?: Yes  Does patient receive dialysis treatments?: No  Does patient have a history of substance abuse?: No    Housing Stability: Low Risk  (2/2/2024)    Housing Stability Vital Sign     Unable to Pay for Housing in the Last Year: No     Number of Places Lived in the Last Year: 1     Unstable Housing in the Last Year: No   Food Insecurity: No Food Insecurity (2/2/2024)    Hunger Vital Sign     Worried About Running Out of Food in the Last Year: Never true     Ran Out of Food in the Last Year: Never true   Transportation Needs: No Transportation Needs (2/2/2024)    PRAPARE - Transportation     Lack of Transportation (Medical): No     Lack of Transportation (Non-Medical): No   Utilities: Not At Risk (2/2/2024)    [facility] Utilities     Threatened with loss of utilities: No     DISCHARGE DETAILS:    Discharge planning discussed with:: patient and friends at bedside- VM left for son Tavo  Freedom of Choice: Yes  Comments - Freedom of Choice: return to [facility]  CM contacted family/caregiver?: Yes  Were Treatment Team discharge recommendations reviewed with patient/caregiver?: Yes  Did patient/caregiver verbalize understanding of patient care needs?: N/A- going to facility  Were patient/caregiver advised of the risks associated with not following Treatment Team discharge recommendations?: Yes    Contacts  Patient Contacts: Tavo  Relationship to Patient:: Family  Contact Method: Phone  Phone Number:  [phone]  Reason/Outcome: Continuity of Care, Emergency Contact, Referral, Discharge Planning    Requested Home Health Care         Is the patient interested in [facility] at discharge?: No    DME Referral Provided  Referral made for DME?: No    Other Referral/Resources/Interventions Provided:  Interventions: SNF, Facility Return, Short Term Rehab  Referral Comments: Patient admitted to [facility] s/p fall. CM met with patient and friends at bedside to complete assessment/ discuss dcp, patient did give CM permission to speak in front of friend. Patient questionable historian- but did confirm she would like to return to [facility] for STR, as she is a LTC resident there. VM left for son Tavo with request for call back to discuss and confirm dcp. Referral has been placed in [facility] for [facility] and facility aware patient will be needing skilled unit once ready to return. CM to follow up as able to continue with dcp.    Would you like to participate in our [facility] service program?  : No - Declined    Treatment Team Recommendation: SNF, Short Term Rehab, Facility Return  Discharge Destination Plan:: Short Term Rehab, SNF, Facility Return  Transport at Discharge : [facility]

## 2024-02-02 NOTE — PLAN OF CARE
Problem: Nutrition/Hydration-ADULT  Goal: Nutrient/Hydration intake appropriate for improving, restoring or maintaining nutritional needs  Description: Monitor and assess patient's nutrition/hydration status for malnutrition. Collaborate with interdisciplinary team and initiate plan and interventions as ordered.  Monitor patient's weight and dietary intake as ordered or per policy. Utilize nutrition screening tool and intervene as necessary. Determine patient's food preferences and provide high-protein, high-caloric foods as appropriate.     INTERVENTIONS:  - Monitor oral intake, urinary output, labs, and treatment plans  - Assess nutrition and hydration status and recommend course of action  - Evaluate amount of meals eaten  - Assist patient with eating if necessary   - Allow adequate time for meals  - Recommend/ encourage appropriate diets, oral nutritional supplements, and vitamin/mineral supplements  - Order, calculate, and assess calorie counts as needed  - Recommend, monitor, and adjust tube feedings and TPN/PPN based on assessed needs  - Assess need for intravenous fluids  - Provide specific nutrition/hydration education as appropriate  - Include patient/family/caregiver in decisions related to nutrition  Outcome: Progressing     Problem: PAIN - ADULT  Goal: Verbalizes/displays adequate comfort level or baseline comfort level  Description: Interventions:  - Encourage patient to monitor pain and request assistance  - Assess pain using appropriate pain scale  - Administer analgesics based on type and severity of pain and evaluate response  - Implement non-pharmacological measures as appropriate and evaluate response  - Consider cultural and social influences on pain and pain management  - Notify physician/advanced practitioner if interventions unsuccessful or patient reports new pain  Outcome: Progressing     Problem: INFECTION - ADULT  Goal: Absence or prevention of progression during  hospitalization  Description: INTERVENTIONS:  - Assess and monitor for signs and symptoms of infection  - Monitor lab/diagnostic results  - Monitor all insertion sites, i.e. indwelling lines, tubes, and drains  - Monitor endotracheal if appropriate and nasal secretions for changes in amount and color  - [place] appropriate cooling/warming therapies per order  - Administer medications as ordered  - Instruct and encourage patient and family to use good hand hygiene technique  - Identify and instruct in appropriate isolation precautions for identified infection/condition  Outcome: Progressing  Goal: Absence of fever/infection during neutropenic period  Description: INTERVENTIONS:  - Monitor WBC    Outcome: Progressing     Problem: SAFETY ADULT  Goal: Patient will remain free of falls  Description: INTERVENTIONS:  - Educate patient/family on patient safety including physical limitations  - Instruct patient to call for assistance with activity   - Consult OT/PT to assist with strengthening/mobility   - Keep Call bell within reach  - Keep bed low and locked with side rails adjusted as appropriate  - Keep care items and personal belongings within reach  - Initiate and maintain comfort rounds  - Make Fall Risk Sign visible to staff  - Offer Toileting every  Hours, in advance of need  - Initiate/Maintain alarm  - Obtain necessary fall risk management equipment:   - Apply yellow socks and bracelet for high fall risk patients  - Consider moving patient to room near nurses station  Outcome: Progressing  Goal: Maintain or return to baseline ADL function  Description: INTERVENTIONS:  -  Assess patient's ability to carry out ADLs; assess patient's baseline for ADL function and identify physical deficits which impact ability to perform ADLs (bathing, care of mouth/teeth, toileting, grooming, dressing, etc.)  - Assess/evaluate cause of self-care deficits   - Assess range of motion  - Assess patient's mobility; develop plan if  impaired  - Assess patient's need for assistive devices and provide as appropriate  - Encourage maximum independence but intervene and supervise when necessary  - Involve family in performance of ADLs  - Assess for home care needs following discharge   - Consider OT consult to assist with ADL evaluation and planning for discharge  - Provide patient education as appropriate  Outcome: Progressing  Goal: Maintains/Returns to pre admission functional level  Description: INTERVENTIONS:  - Perform AM-PAC 6 Click Basic Mobility/ Daily Activity assessment daily.  - Set and communicate daily mobility goal to care team and patient/family/caregiver.   - Collaborate with rehabilitation services on mobility goals if consulted  - Perform Range of Motion  times a day.  - Reposition patient every  hours.  - Dangle patient  times a day  - Stand patient  times a day  - Ambulate patient  times a day  - Out of bed to chair  times a day   - Out of bed for meals  times a day  - Out of bed for toileting  - Record patient progress and toleration of activity level   Outcome: Progressing     Problem: DISCHARGE PLANNING  Goal: Discharge to home or other facility with appropriate resources  Description: INTERVENTIONS:  - Identify barriers to discharge w/patient and caregiver  - Arrange for needed discharge resources and transportation as appropriate  - Identify discharge learning needs (meds, wound care, etc.)  - Arrange for interpretive services to assist at discharge as needed  - Refer to Case Management Department for coordinating discharge planning if the patient needs post-hospital services based on physician/advanced practitioner order or complex needs related to functional status, cognitive ability, or social support system  Outcome: Progressing     Problem: Knowledge Deficit  Goal: Patient/family/caregiver demonstrates understanding of disease process, treatment plan, medications, and discharge instructions  Description: Complete  learning assessment and assess knowledge base.  Interventions:  - Provide teaching at level of understanding  - Provide teaching via preferred learning methods  Outcome: Progressing

## 2024-02-02 NOTE — PROGRESS NOTES
[facility]  Progress Note  Name: Jefry Mcclure I  MRN: [id number]  Unit/Bed#: W -01 I Date of Admission: 1/31/2024   Date of Service: 2/2/2024 I Hospital Day: 2    Assessment/Plan   Fall  Assessment & Plan  Status post fall with the below noted injuries.  Fall precautions.  Geriatric Medicine consultation for evaluation, medication review and recommendations.  PT and OT evaluation and treatment as indicated.  Case Management consultation for disposition planning.      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  Appreciate Orthopedic surgery evaluation, recommendations and interventions as noted.  2/1--planning of OR today  Maintain non weightbearing status on the right lower extremity.  MISSY drain in place--125ml Output overnight  Monitor right lower extremity neurovascular exam.  Continue multimodal analgesic regimen.  Continue DVT prophylaxis.  PT and OT evaluation and treatment as indicated.  Outpatient follow up with Orthopedic surgery for re-evaluation.      UTI (urinary tract infection)  Assessment & Plan  POA--admission UA positive for Nitrates and WBCs  Treat with 3 day course of rocephin.   Continue to monitor.     History of dementia  Assessment & Plan  Continue current medication regimen.  Outpatient follow-up with PCP.  Geriatrics consulted    History of hypothyroidism  Assessment & Plan  Continue current medication regimen.  Outpatient follow-up with PCP.      Acute pain due to trauma  Assessment & Plan  Acute pain secondary to traumatic injuries.  Continue multimodal analgesic regimen.  Bowel regimen as long as using opioids.  Continue to monitor pain and adjust regimen as indicated.               Bowel Regimen: Senna S  VTE Prophylaxis:Sequential compression device (Venodyne)  and Enoxaparin (Lovenox)     Disposition: Pending PT/OT eval    Subjective   Chief  "Complaint: \"I'm sore\"    Subjective: ROS is limited due to dementia, though patient does note being sore in her right hip.  She also reports tolerating breakfast this morning.  She denies any other complaints.  She confirms she did well overnight, getting adequate sleep.     Objective   Vitals:   Temp:  [96.8 °F (36 °C)-98.7 °F (37.1 °C)] 98.7 °F (37.1 °C)  HR:  [] 88  Resp:  [18-20] 20  BP: ()/(54-78) 103/68    I/O         01/31 0701  02/01 0700 02/01 0701  02/02 0700 02/02 0701 02/03 0700    P.O.  237     IV Piggyback  150     Total Intake(mL/kg)  387 (4.5)     Urine (mL/kg/hr) 80 0 (0)     Drains  125 25    Total Output 80 125 25    Net -80 +262 -25           Unmeasured Urine Occurrence  2 x              Physical Exam:   GENERAL APPEARANCE: No acute distress.  Resting in bed, upright, eating breakfast.  NEURO: GCS is 14--patient is oriented to general situation, she is disoriented to place and time.  HEENT: Normocephalic, atraumatic.  Neck supple.  CV: Regular rate and rhythm.  +2 radial and dorsalis pedis pulses, bilaterally.  LUNGS: Clear to auscultation, bilaterally.  Chest wall is nontender on palpation.  GI: Abdomen is soft and nontender  : Pelvis is stable.  MSK: Right hip is swollen and tender on palpation.  MISSY drain is in place, amount of serosanguineous noted in drain.  Patient is able to fully range right foot and ankle.  All other extremities are nontender and display for range of motion.  SKIN: Right hip surgical dressings are clean, dry, intact.    Invasive Devices       Peripheral Intravenous Line  Duration             Peripheral IV 01/31/24 Left Antecubital 1 day    Peripheral IV 02/01/24 Right Hand <1 day              Drain  Duration             Closed/Suction Drain Right Thigh Bulb 15 Fr. <1 day                          Lab Results: Results: I have personally reviewed all pertinent laboratory/tests results, BMP/CMP:   Lab Results   Component Value Date    SODIUM 138 02/02/2024    K " 4.6 02/02/2024     02/02/2024    CO2 23 02/02/2024    BUN 19 02/02/2024    CREATININE 0.61 02/02/2024    CALCIUM 8.0 (L) 02/02/2024    EGFR 81 02/02/2024   , and CBC:   Lab Results   Component Value Date    WBC 12.79 (H) 02/02/2024    HGB 10.0 (L) 02/02/2024    HCT 30.0 (L) 02/02/2024    MCV 95 02/02/2024     02/02/2024    RBC 3.17 (L) 02/02/2024    MCH 31.5 02/02/2024    MCHC 33.3 02/02/2024    RDW 14.2 02/02/2024    MPV 12.2 02/02/2024    NRBC 0 02/02/2024     Imaging: I have personally reviewed pertinent reports.     Other Studies: none

## 2024-02-02 NOTE — PROGRESS NOTES
Progress Note - Orthopedics   Jefry Mcclure 88 y.o. female MRN: [id number]  Unit/Bed#: W -01      Subjective:    88 y.o.female seen and evaluated at bedside. About to work with physical therapy. Has some pain in the right leg. Ranked at 6/10 at present.     Labs:  0   Lab Value Date/Time    HCT 30.0 (L) 02/02/2024 0559    HCT 33.5 (L) 02/01/2024 0457    HCT 37.7 01/31/2024 1352    HGB 10.0 (L) 02/02/2024 0559    HGB 11.2 (L) 02/01/2024 0457    HGB 12.4 01/31/2024 1352    INR 1.09 01/31/2024 1623    WBC 12.79 (H) 02/02/2024 0559    WBC 7.34 02/01/2024 0457    WBC 10.56 (H) 01/31/2024 1352       Meds:    Current Facility-Administered Medications:     acetaminophen (TYLENOL) tablet 975 mg, 975 mg, Oral, Q8H [facility], Amado Ghosh PA-C, 975 mg at 02/02/24 0532    ceftriaxone (ROCEPHIN) 1 g/50 mL in dextrose IVPB, 1,000 mg, Intravenous, Q24H, Amado Ghosh PA-C, Last Rate: 100 mL/hr at 02/01/24 0937, 1,000 mg at 02/01/24 0937    donepezil (ARICEPT) tablet 10 mg, 10 mg, Oral, , ELAINE VidalC, 10 mg at 02/01/24 2114    enoxaparin (LOVENOX) subcutaneous injection 30 mg, 30 mg, Subcutaneous, Q12H [facility], YFN Vidal-C, 30 mg at 02/02/24 0837    gabapentin (NEURONTIN) capsule 100 mg, 100 mg, Oral, HS, Amado Ghosh PA-C, 100 mg at 02/01/24 2114    HYDROmorphone HCl (DILAUDID) injection 0.2 mg, 0.2 mg, Intravenous, Q2H PRN, Amado Ghosh PA-C, 0.2 mg at 02/01/24 2114    lactated ringers infusion, 50 mL/hr, Intravenous, Continuous, Antonia Fultonar, CRNA, Last Rate: 50 mL/hr at 02/01/24 1741, 50 mL/hr at 02/01/24 1741    levothyroxine tablet 25 mcg, 25 mcg, Oral, Early Morning, Amado Ghosh PA-C, 25 mcg at 02/02/24 0532    lidocaine (LIDODERM) 5 % patch 1 patch, 1 patch, Topical, Daily, Amado Ghosh PA-C, 1 patch at 02/02/24 0837    naloxone (NARCAN) 0.04 mg/mL syringe 0.04 mg, 0.04 mg, Intravenous, Q1MIN PRN, Amado Ghosh PA-C    oxyCODONE (ROXICODONE) split tablet 2.5 mg, 2.5 mg, Oral, Q4H PRN **OR** oxyCODONE  "(ROXICODONE) IR tablet 5 mg, 5 mg, Oral, Q4H PRN, Amdao Ghosh PA-C, 5 mg at 02/02/24 0837    polyethylene glycol (MIRALAX) packet 17 g, 17 g, Oral, Daily, Amado Ghosh PA-C, 17 g at 02/02/24 0837    senna-docusate sodium (SENOKOT S) 8.6-50 mg per tablet 1 tablet, 1 tablet, Oral, HS, Amado Ghosh PA-C, 1 tablet at 02/01/24 2114    Blood Culture:   No results found for: \"BLOODCX\"    Wound Culture:   No results found for: \"WOUNDCULT\"    Ins and Outs:  I/O last 24 hours:  In: 387 [P.O.:237; IV Piggyback:150]  Out: 125 [Drains:125]          Physical:  Vitals:    02/02/24 0817   BP: 103/68   Pulse: 102   Resp:    Temp:    SpO2: 92%     Musculoskeletal: right Lower Extremity  Surgical dressings c/d/I without strike through  Drain in place. 125cc bloody drainage since placement.   Thigh and calf soft and compressible.   Sensation intact to saphenous, sural, tibial, superficial peroneal nerve, and deep peroneal  Motor intact to +FHL/EHL, +ankle dorsi/plantar flexion  2+ DP pulse  Digits warm and well perfused  Capillary refill < 2 seconds    Assessment:    88 y.o.female s/p open reduction internal fixation of right periprosthetic femur fracture with Dr. Evangelista 2/1/24 (POD 1) .     Plan:  WBAT to the right lower extremity.   Maintain and record drain output.   Will monitor for ABLA and administer IVF/prbc as indicated for Greater than 2 gram drop or Hgb < 7, defer to primary team.   PT/OT  Pain control per primary team.   DVT ppx: lovenox x 4 weeks.   Medical management per primary team.   Dispo: Ortho will follow  Should follow up with Dr. Evangelista upon discharge.     Shira Grossman PA-C      "

## 2024-02-02 NOTE — PLAN OF CARE
Problem: PHYSICAL THERAPY ADULT  Goal: Performs mobility at highest level of function for planned discharge setting.  See evaluation for individualized goals.  Description: Treatment/Interventions: Functional transfer training, LE strengthening/ROM, Therapeutic exercise, Endurance training, Gait training, Equipment eval/education, Bed mobility, Patient/family training          See flowsheet documentation for full assessment, interventions and recommendations.  Note:    Problem List: Decreased strength, Decreased range of motion, Decreased endurance, Impaired balance, Decreased mobility, Decreased safety awareness, Impaired hearing, Pain, Orthopedic restrictions, Obesity  Assessment: Pt presents from facility after mechanical fall on 1/30/24. Dx: right periprosthetic hip fx, dementia, and s/p fall. 2/1/24 open reduction internal fixation of right periprosthetic proximal femur fracture. order placed for PT eval and tx, w/ activity order of activity as tolerated and WBAT R LE. pt presents w/ comorbidities of arthritis, bilateral TKA, and bilateral MOHSEN and personal factors of advanced age, mobilizing w/ assistive device, positive fall history, inability to perform IADLs, and inability to perform ADLs. pt presents w/ pain, weakness, decreased ROM, decreased endurance, impaired balance, decreased safety awareness, orthopedic restrictions, and fall risk. these impairments are evident in findings from physical examination (weakness and decreased ROM), mobility assessment (need for mod to max assist x 1 to 2 for bed mobility and transfers, inability to complete pregait activities, need for input for mobility technique/safety), and Barthel Index: 45/100. pt needed input for task focus and mobility technique. pt is at risk for falls due to physical and safety awareness deficits. pt's clinical presentation is unstable/unpredictable (evident in tachycardia, need for assist x 1 to 2 w/ all phases of mobility and inability to  ambulate when usually mobilizing independently, pain impacting overall mobility status, and need for input for mobility technique/safety). pt needs inpatient PT tx to improve mobility deficits and progress mobility training as appropriate.        Rehab Resource Intensity Level, PT: II (Moderate Resource Intensity) (PT upon return to SNF)    See flowsheet documentation for full assessment.

## 2024-02-02 NOTE — PHYSICAL THERAPY NOTE
PHYSICAL THERAPY EVALUATION NOTE    Patient Name: Jefry Mcclure  Today's Date: 2/2/2024  AGE:   88 y.o.  Mrn:   [id number]  ADMIT DX:  Weakness [R53.1]  Dementia ([facility]) [F03.90]  Periprosthetic fracture around internal prosthetic right hip joint, initial encounter ([facility]) [M97.01XA]  Degenerative arthritis [M19.90]    Past Medical History:   Diagnosis Date    Arthritis      Length Of Stay: 2  PHYSICAL THERAPY EVALUATION :    02/02/24 0858   PT Last Visit   PT Visit Date 02/02/24   Pain Assessment   Pain Assessment Tool 0-10   Pain Score 6   Pain Location/Orientation Orientation: Right;Location: Hip   Hospital Pain Intervention(s) Repositioned;Ambulation/increased activity;Other (Comment)  (Supriya CLEMENT provided pain medication prior to session)   Restrictions/Precautions   RLE Weight Bearing Per Order WBAT   Other Precautions Chair Alarm;Bed Alarm;WBS;Fall Risk;Pain;Hard of hearing  ([place])   Home Living   Type of Home SNF  ([facility])   Additional Comments ambulates w/ rollator. receives assist w/ ADLs and IADLs. 2 falls in last 6 months.   General   Additional Pertinent History 2/1/24 at 17:49 heart rate was 106 BPM.   Family/Caregiver Present No   Cognition   Arousal/Participation Alert   Orientation Level Oriented to person;Other (Comment)  (pt was identified w/ full name, birth date)   Following Commands Follows one step commands with increased time or repetition   Comments room air resting pulse ox 94% and 97 BPM, active 92% and 104 BPM.   Subjective   Subjective pt seen supine in bed. agreed to PT eval. states having R LE pain. occasional input was needed for task focus.   RUE Assessment   RUE Assessment WFL   LUE Assessment   LUE Assessment WFL   RLE Assessment   RLE Assessment X  (hip flexion 2+/5 extension 3-/5, knee flexion 3-/5 extension 3/5, ankle 3-/5)   LLE Assessment   LLE Assessment WFL  (3+/5)   Vision-Basic Assessment    Current Vision Wears glasses all the time   Bed Mobility   Supine to Sit 3  Moderate assistance   Additional items Assist x 1;HOB elevated;Bedrails;Increased time required;Verbal cues;LE management  (for bedrail use, trunk/LE positioning)   Transfers   Sit to Stand 2  Maximal assistance   Additional items Assist x 2;Increased time required;Verbal cues  (for LE positioning)   Stand to Sit 2  Maximal assistance   Additional items Assist x 2;Increased time required;Verbal cues  (for body positioning)   Stand pivot 2  Maximal assistance  (right hip and knee buckling w/ weight bearing. limited ability to place weight through R LE.)   Additional items Assist x 2;Increased time required;Verbal cues  (for walker placement, LE positioning)   Additional Comments pt had limited ability to weight shift in standing and was unable to complete advance/retreat (despite assist x2).   Ambulation/Elevation   Gait pattern Not appropriate   Assistive Device Rolling walker   Balance   Static Sitting Fair   Static Standing Zero  (assist x2, w/ roller alker)   Ambulatory Zero   Activity Tolerance   Activity Tolerance Patient limited by fatigue;Patient limited by pain   Nurse Made Aware spoke Stacy ROBLESG, Citlaly OT, Karen FISH, Shira AP, Romel CM   Assessment   Problem List Decreased strength;Decreased range of motion;Decreased endurance;Impaired balance;Decreased mobility;Decreased safety awareness;Impaired hearing;Pain;Orthopedic restrictions;Obesity   Assessment Pt presents from facility after mechanical fall on 1/30/24. Dx: right periprosthetic hip fx, dementia, and s/p fall. 2/1/24 open reduction internal fixation of right periprosthetic proximal femur fracture. order placed for PT eval and tx, w/ activity order of activity as tolerated and WBAT R LE. pt presents w/ comorbidities of arthritis, bilateral TKA, and bilateral MOHSEN and personal factors of advanced age, mobilizing w/ assistive device, positive fall history,  inability to perform IADLs, and inability to perform ADLs. pt presents w/ pain, weakness, decreased ROM, decreased endurance, impaired balance, decreased safety awareness, orthopedic restrictions, and fall risk. these impairments are evident in findings from physical examination (weakness and decreased ROM), mobility assessment (need for mod to max assist x 1 to 2 for bed mobility and transfers, inability to complete pregait activities, need for input for mobility technique/safety), and Barthel Index: 45/100. pt needed input for task focus and mobility technique. pt is at risk for falls due to physical and safety awareness deficits. pt's clinical presentation is unstable/unpredictable (evident in tachycardia, need for assist x 1 to 2 w/ all phases of mobility and inability to ambulate when usually mobilizing independently, pain impacting overall mobility status, and need for input for mobility technique/safety). pt needs inpatient PT tx to improve mobility deficits and progress mobility training as appropriate.   Goals   Patient Goals walk down the herr.   STG Expiration Date 02/12/24   Short Term Goal #1 pt will: Increase bilateral LE strength 1/2 grade to facilitate independent mobility, Perform bed mobility w/ minx1 to increase level of independence, Perform all transfers w/ modx1 to improve independence, Ambulate 100 ft. with roller walker w/ modx1 w/o LOB to improve functional independence, Increase all balance 1 grade to decrease risk for falls, Complete exercise program independently to increase strength and endurance, Tolerate 3 hr OOB to faciliate upright tolerance, Tolerate standing 2 minutes w/ minx1 to facilitate functional task performance, Improve Barthel Index score to 65 or greater to facilitate independence, and Complete Timed Up and Go or Comfortable Gait Speed to further assess mobility and monitor progress   PT Treatment Day 1   Plan   Treatment/Interventions Functional transfer training;LE  strengthening/ROM;Therapeutic exercise;Endurance training;Gait training;Equipment eval/education;Bed mobility;Patient/family training   PT Frequency 3-5x/wk   Discharge Recommendation   Rehab Resource Intensity Level, PT II (Moderate Resource Intensity)  (PT upon return to SNF)   AM-PAC Basic Mobility Inpatient   Turning in Flat Bed Without Bedrails 3   Lying on Back to Sitting on Edge of Flat Bed Without Bedrails 2   Moving Bed to Chair 1   Standing Up From Chair Using Arms 1   Walk in Room 1   Climb 3-5 Stairs With Railing 1   Basic Mobility Inpatient Raw Score 9   Turning Head Towards Sound 4   Follow Simple Instructions 3   Low Function Basic Mobility Raw Score  16   Low Function Basic Mobility Standardized Score  25.72   Highest Level Of Mobility   JH-HLM Goal 3: Sit at edge of bed   JH-HLM Achieved 5: Stand (1 or more minutes)   Barthel Index   Feeding 10   Bathing 0   Grooming Score 5   Dressing Score 5   Bladder Score 5   Bowels Score 10   Toilet Use Score 5   Transfers (Bed/Chair) Score 5   Mobility (Level Surface) Score 0   Stairs Score 0   Barthel Index Score 45   Additional Treatment Session   Start Time 0858   End Time 0908   Treatment Assessment Pt agreed to participate in PT intervention. Pt completed additional mobilization to address physical and mobility deficits noted during eval. Sit < - > stand transfer w/ min to modx1. Completed stand pivot transfer x2 w/ roller walker w/ modx2 (chair to commode and back). Additional activity was not possible due to fatigue. Pt continues to be unable to ambulate and having limited ability to weight bear through R LE. Therapist provided education to pt including walker management and transfer technique. Pt shows improvement w/ decreased level of assistance. Further inpatient PT intervention is necessary to decrease fall risk and maximize functional independence.   Equipment Use roller walker   Additional Treatment Day 1   End of Consult   Patient Position at End  of Consult Bedside chair;Bed/Chair alarm activated;All needs within reach     The patient's AM-PAC Basic Mobility Inpatient Short Form Raw Score is 9. A Raw score of less than or equal to 16 suggests the patient may benefit from discharge to post-acute rehabilitation services. Please also refer to the recommendation of the Physical Therapist for safe discharge planning.    Skilled PT recommended while in hospital and upon DC to progress pt toward treatment goals.     Vicente Collier, PT

## 2024-02-02 NOTE — PLAN OF CARE
Problem: Nutrition/Hydration-ADULT  Goal: Nutrient/Hydration intake appropriate for improving, restoring or maintaining nutritional needs  Description: Monitor and assess patient's nutrition/hydration status for malnutrition. Collaborate with interdisciplinary team and initiate plan and interventions as ordered.  Monitor patient's weight and dietary intake as ordered or per policy. Utilize nutrition screening tool and intervene as necessary. Determine patient's food preferences and provide high-protein, high-caloric foods as appropriate.     INTERVENTIONS:  - Monitor oral intake, urinary output, labs, and treatment plans  - Assess nutrition and hydration status and recommend course of action  - Evaluate amount of meals eaten  - Assist patient with eating if necessary   - Allow adequate time for meals  - Recommend/ encourage appropriate diets, oral nutritional supplements, and vitamin/mineral supplements  - Order, calculate, and assess calorie counts as needed  - Recommend, monitor, and adjust tube feedings and TPN/PPN based on assessed needs  - Assess need for intravenous fluids  - Provide specific nutrition/hydration education as appropriate  - Include patient/family/caregiver in decisions related to nutrition  Outcome: Progressing     Problem: PAIN - ADULT  Goal: Verbalizes/displays adequate comfort level or baseline comfort level  Description: Interventions:  - Encourage patient to monitor pain and request assistance  - Assess pain using appropriate pain scale  - Administer analgesics based on type and severity of pain and evaluate response  - Implement non-pharmacological measures as appropriate and evaluate response  - Consider cultural and social influences on pain and pain management  - Notify physician/advanced practitioner if interventions unsuccessful or patient reports new pain  Outcome: Progressing     Problem: INFECTION - ADULT  Goal: Absence or prevention of progression during  hospitalization  Description: INTERVENTIONS:  - Assess and monitor for signs and symptoms of infection  - Monitor lab/diagnostic results  - Monitor all insertion sites, i.e. indwelling lines, tubes, and drains  - Monitor endotracheal if appropriate and nasal secretions for changes in amount and color  - [place] appropriate cooling/warming therapies per order  - Administer medications as ordered  - Instruct and encourage patient and family to use good hand hygiene technique  - Identify and instruct in appropriate isolation precautions for identified infection/condition  Outcome: Progressing  Goal: Absence of fever/infection during neutropenic period  Description: INTERVENTIONS:  - Monitor WBC    Outcome: Progressing     Problem: SAFETY ADULT  Goal: Patient will remain free of falls  Description: INTERVENTIONS:  - Educate patient/family on patient safety including physical limitations  - Instruct patient to call for assistance with activity   - Consult OT/PT to assist with strengthening/mobility   - Keep Call bell within reach  - Keep bed low and locked with side rails adjusted as appropriate  - Keep care items and personal belongings within reach  - Initiate and maintain comfort rounds  - Make Fall Risk Sign visible to staff  - Apply yellow socks and bracelet for high fall risk patients  - Consider moving patient to room near nurses station  Outcome: Progressing  Goal: Maintain or return to baseline ADL function  Description: INTERVENTIONS:  -  Assess patient's ability to carry out ADLs; assess patient's baseline for ADL function and identify physical deficits which impact ability to perform ADLs (bathing, care of mouth/teeth, toileting, grooming, dressing, etc.)  - Assess/evaluate cause of self-care deficits   - Assess range of motion  - Assess patient's mobility; develop plan if impaired  - Assess patient's need for assistive devices and provide as appropriate  - Encourage maximum independence but intervene and  supervise when necessary  - Involve family in performance of ADLs  - Assess for home care needs following discharge   - Consider OT consult to assist with ADL evaluation and planning for discharge  - Provide patient education as appropriate  Outcome: Progressing  Goal: Maintains/Returns to pre admission functional level  Description: INTERVENTIONS:  - Perform AM-PAC 6 Click Basic Mobility/ Daily Activity assessment daily.  - Set and communicate daily mobility goal to care team and patient/family/caregiver.   - Collaborate with rehabilitation services on mobility goals if consulted  - Out of bed for toileting  - Record patient progress and toleration of activity level   Outcome: Progressing     Problem: DISCHARGE PLANNING  Goal: Discharge to home or other facility with appropriate resources  Description: INTERVENTIONS:  - Identify barriers to discharge w/patient and caregiver  - Arrange for needed discharge resources and transportation as appropriate  - Identify discharge learning needs (meds, wound care, etc.)  - Arrange for interpretive services to assist at discharge as needed  - Refer to Case Management Department for coordinating discharge planning if the patient needs post-hospital services based on physician/advanced practitioner order or complex needs related to functional status, cognitive ability, or social support system  Outcome: Progressing     Problem: Knowledge Deficit  Goal: Patient/family/caregiver demonstrates understanding of disease process, treatment plan, medications, and discharge instructions  Description: Complete learning assessment and assess knowledge base.  Interventions:  - Provide teaching at level of understanding  - Provide teaching via preferred learning methods  Outcome: Progressing     Problem: Prexisting or High Potential for Compromised Skin Integrity  Goal: Skin integrity is maintained or improved  Description: INTERVENTIONS:  - Identify patients at risk for skin breakdown  -  Assess and monitor skin integrity  - Assess and monitor nutrition and hydration status  - Monitor labs   - Assess for incontinence   - Turn and reposition patient  - Assist with mobility/ambulation  - Relieve pressure over bony prominences  - Avoid friction and shearing  - Provide appropriate hygiene as needed including keeping skin clean and dry  - Evaluate need for skin moisturizer/barrier cream  - Collaborate with interdisciplinary team   - Patient/family teaching  - Consider wound care consult   Outcome: Progressing

## 2024-02-02 NOTE — PLAN OF CARE
Problem: OCCUPATIONAL THERAPY ADULT  Goal: Performs self-care activities at highest level of function for planned discharge setting.  See evaluation for individualized goals.  Description: Treatment Interventions: ADL retraining, Functional transfer training, UE strengthening/ROM, Endurance training, Patient/family training, Equipment evaluation/education, Compensatory technique education, Activityengagement          See flowsheet documentation for full assessment, interventions and recommendations.   Note: Limitation: Decreased ADL status, Decreased UE strength, Decreased Safe judgement during ADL (balance, functional reach , sequencing, safety awareness, trunk control, activity tolerance, standing tolerance)  Prognosis: Fair  Assessment: Pt is a 88 year old female seen by occupational therapy at [facility] following admission on 01/31/2024 d/t fall. Dx; mildly displaced peritrochanteric periprosthetic femur fracture and UTI. Open reduction w/ internal fixation (ORIF) femur performed 02/01/2024. Please see above for comprehensive list of PMHx impacting functional performance. Upon evaluation pt is performing below functional baseline. Pt presents with the following deficits decreased balance, decreased strength, decreased activity tolerance, decreased trunk control, decreased standing tolerance, decreased endurance, decreased sequencing, decreased safety awareness. Pt occupational performance is supported by accessible environment, PLOF, 24 hour care, staff/facility assistance. Barriers to discharge include A needed for ADL's and functional mobility, (+) falls, A x 2 for mobility. Pt would benefit from skilled therapy to address ADL retraining, functional transfer training, balance, endurance, activity engagement, education for compensatory dressing to maximize functional performance and decrease caregiver burden. At time of discharge pt is recommended level ll moderate resource intensity.     Rehab  Resource Intensity Level, OT: II (Moderate Resource Intensity)        Karen Davis

## 2024-02-02 NOTE — PROGRESS NOTES
Peripheral Nerve Block Follow-up Note - Acute Pain Service    Jefry Mcclure 88 y.o. female MRN: [id number]  Unit/Bed#: W -01 Encounter: [id number]      Assessment:   Principal Problem:    Periprosthetic fracture around internal prosthetic hip joint  Active Problems:    Acute pain due to trauma    Fall    History of hypothyroidism    History of dementia    UTI (urinary tract infection)    Jefry Mcclure is a 88 y.o. year old female who presented following a fall sustaining a periprosthetic fracture of the right hip.  She is now status post ORIF of the proximal right femur for which she received a right-sided single shot femoral and lateral femoral cutaneous nerve block with local anesthetic for postoperative analgesia.  Acute pain service was consulted for postoperative block follow-up.    Seen and examined at bedside this morning, resting and appears comfortable in bed.  She does report some mild/moderate pain of the right hip and denies any residual sensory or motor deficit secondary to peripheral nerve block.  She has utilized oxycodone 5 mg x 2 postoperatively with good analgesic effect and has required 1 dose of IV Dilaudid 0.2 mg for breakthrough pain.  She currently denies any opioid-induced side effects.    Plan:   Right-sided single shot femoral and lateral femoral cutaneous nerve block with local anesthetic have resolved appropriately without sensory/motor deficit.    - Multimodal analgesia with:  Tylenol 975 mg every 8 hours scheduled  Gabapentin 100 mg daily at bedtime  Estimated Creatinine Clearance: 68.6 mL/min (by C-G formula based on SCr of 0.61 mg/dL).  Lidocaine patch, 12 hours on/12 hours off to affected area  Oxycodone 2.5 mg every 4 hours as needed for moderate pain  Oxycodone 5 mg every 4 hours as needed for severe pain  IV hydromorphone 0.2 mg every 2 hours as needed for breakthrough pain  Recommend discontinuation of intravenous opioids in next 24 hours if pain continues to be  well-controlled  Narcan as needed for respiratory depression/opioid reversal    Treatment plan and recommendations discussed with primary care service.  She may continue the current analgesic regimen as outlined above.  APS will sign off at this time. Thank you for the consult. All opioids and other analgesics to be written at discretion of primary team. Please contact Acute Pain Service - [facility] via [employer] from [phone] with additional questions or concerns. See [employer] or Amion for additional contacts and after hours information.    Pain History  Current pain location(s): Right hip  Pain Scale:   4/10  Quality: Aching  24 hour history: No acute events overnight, remains hemodynamically stable.  Tolerating current analgesic regimen and denies any opioid-induced side effects.  She denies shortness of breath, nausea/vomiting.    Opioid requirement previous 24 hours:   10 mg oxycodone  0.2 mg IV Dilaudid    Meds/Allergies   all current active meds have been reviewed, current meds:   Current Facility-Administered Medications   Medication Dose Route Frequency    acetaminophen (TYLENOL) tablet 975 mg  975 mg Oral Q8H MYNOR    ceftriaxone (ROCEPHIN) 1 g/50 mL in dextrose IVPB  1,000 mg Intravenous Q24H    donepezil (ARICEPT) tablet 10 mg  10 mg Oral HS    enoxaparin (LOVENOX) subcutaneous injection 30 mg  30 mg Subcutaneous Q12H MYNOR    gabapentin (NEURONTIN) capsule 100 mg  100 mg Oral HS    HYDROmorphone HCl (DILAUDID) injection 0.2 mg  0.2 mg Intravenous Q2H PRN    lactated ringers infusion  50 mL/hr Intravenous Continuous    levothyroxine tablet 25 mcg  25 mcg Oral Early Morning    lidocaine (LIDODERM) 5 % patch 1 patch  1 patch Topical Daily    naloxone (NARCAN) 0.04 mg/mL syringe 0.04 mg  0.04 mg Intravenous Q1MIN PRN    oxyCODONE (ROXICODONE) split tablet 2.5 mg  2.5 mg Oral Q4H PRN    Or    oxyCODONE (ROXICODONE) IR tablet 5 mg  5 mg Oral Q4H PRN    polyethylene glycol (MIRALAX) packet 17 g  17 g Oral Daily     senna-docusate sodium (SENOKOT S) 8.6-50 mg per tablet 1 tablet  1 tablet Oral HS   , and PTA meds:   Prior to Admission Medications   Prescriptions Last Dose Informant Patient Reported? Taking?   Calcium Carbonate-Vitamin D 600-200 MG-UNIT TABS 1/31/2024  Yes Yes   Sig: Calcium 600 + D(3) 600 mg-5 mcg (200 unit) tablet   take 1 tablet by mouth twice a day   donepezil (ARICEPT) 5 mg tablet Not Taking  Yes No   Patient not taking: Reported on 1/31/2024      Facility-Administered Medications: None       No Known Allergies    Objective     Temp:  [96.8 °F (36 °C)-98.7 °F (37.1 °C)] 98.7 °F (37.1 °C)  HR:  [] 102  Resp:  [18-20] 20  BP: ()/(54-78) 103/68    Physical Exam  Vitals reviewed.   Constitutional:       General: She is not in acute distress.     Appearance: She is not ill-appearing or toxic-appearing.   HENT:      Head: Normocephalic and atraumatic.   Cardiovascular:      Rate and Rhythm: Normal rate.   Pulmonary:      Effort: Pulmonary effort is normal. No respiratory distress.   Chest:      Chest wall: No tenderness.   Abdominal:      General: Abdomen is flat. There is no distension.      Palpations: Abdomen is soft.      Tenderness: There is no abdominal tenderness.   Musculoskeletal:         General: Swelling (R hip) and tenderness (R hip) present. No deformity. Normal range of motion.      Cervical back: Normal range of motion.   Skin:     General: Skin is warm and dry.      Coloration: Skin is not pale.      Findings: No rash.   Neurological:      Mental Status: She is alert and oriented to person, place, and time. Mental status is at baseline.      Sensory: No sensory deficit.      Motor: Weakness (RLE secondary to injury/surgery) present.   Psychiatric:         Mood and Affect: Mood normal.         Behavior: Behavior normal.           Lab Results:   Results from last 7 days   Lab Units 02/02/24  0559   WBC Thousand/uL 12.79*   HEMOGLOBIN g/dL 10.0*   HEMATOCRIT % 30.0*   PLATELETS  Thousands/uL 160      Results from last 7 days   Lab Units 02/02/24  0559   POTASSIUM mmol/L 4.6   CHLORIDE mmol/L 108   CO2 mmol/L 23   BUN mg/dL 19   CREATININE mg/dL 0.61   CALCIUM mg/dL 8.0*       Imaging Studies: I have personally reviewed pertinent reports.        Counseling / Coordination of Care  Total floor / unit time spent today 15 minutes. Greater than 50% of total time was spent with the patient and / or family counseling and / or coordination of care. A description of the counseling / coordination of care: Patient interview, physical examination, review of PDMP, review of medical record, review of imaging and laboratory data, development of pain management plan, discussion of pain management plan with patient and primary service.      Please note that the APS provides consultative services regarding pain management only.  With the exception of ketamine, peripheral nerve catheters, and epidural infusions (and except when indicated), final decisions regarding starting or changing doses of analgesic medications are at the discretion of the consulting service.  Off hours consultation and/or medication management is generally not available.    MELISA Bhandari  Acute Pain Service

## 2024-02-02 NOTE — OCCUPATIONAL THERAPY NOTE
Occupational Therapy Evaluation     Patient Name: Jefry Mcclure  Today's Date: 2/2/2024  Problem List  Principal Problem:    Periprosthetic fracture around internal prosthetic hip joint  Active Problems:    Acute pain due to trauma    Fall    History of hypothyroidism    History of dementia    UTI (urinary tract infection)    Past Medical History  Past Medical History:   Diagnosis Date    Arthritis      Past Surgical History  Past Surgical History:   Procedure Laterality Date    ADENOIDECTOMY      HIP SURGERY Bilateral     KNEE SURGERY Bilateral     ORIF FEMUR FRACTURE Right 2/1/2024    Procedure: OPEN REDUCTION W/ INTERNAL FIXATION (ORIF) FEMUR, and all associated procedures;  Surgeon: Wilbur Evangelista DO;  Location: [facility];  Service: Orthopedics      02/02/24 0840   OT Last Visit   OT Visit Date 02/02/24   Note Type   Note type Evaluation   Pain Assessment   Pain Assessment Tool 0-10   Pain Score 6   Pain Location/Orientation Orientation: Right;Location: Hip   Pain Radiating Towards R side of back   Pain Onset/Description Onset: Ongoing;Descriptor: Discomfort   Effect of Pain on Daily Activities limits functional performance   Patient's Stated Pain Goal No pain   Hospital Pain Intervention(s) Ambulation/increased activity;Emotional support;Repositioned   Restrictions/Precautions   Weight Bearing Precautions Per Order Yes   RLE Weight Bearing Per Order WBAT   Other Precautions Fall Risk;Pain;Hard of hearing;Multiple lines;Chair Alarm;Bed Alarm;Cognitive  (IV; Dementia at baseline)   Home Living   Type of Home SNF  ([facility])   Home Layout Multi-level;Performs ADLs on one level;Access  ([facility])   Bathroom Shower/Tub Walk-in shower   Bathroom Equipment Grab bars in shower;Shower chair;Grab bars around toilet   Bathroom Accessibility Accessible   Home Equipment Other (Comment)  (Rollator)   Additional Comments Pt lives at [facility] and uses a rollater to navigate  house hold distances   Prior Function   Level of [place] Needs assistance with ADLs;Needs assistance with IADLS;Independent with functional mobility   Lives With Facility staff  (SNF)   Receives Help From Other (Comment)  ([facility] home staff)   IADLs Family/Friend/Other provides transportation;Family/Friend/Other provides medication management;Family/Friend/Other provides meals   Falls in the last 6 months 1 to 4  (pt reports 2 mechanical falls in bathroom; questional accuracy d/t cognition)   Vocational Retired   Comments pt Mod (I) functional mobility household distances c Rollator; Needs A c ADL's, and IADL's.   Lifestyle   Autonomy at baseline pt is (I) with functional mobility household distances c rollator and needs A with ADL's and IADL's.(+) falls, and retired.   Reciprocal Relationships Son Tavo   Intrinsic Gratification enjoys participating in activites within her SNF community   General   Additional Pertinent History pt admitted for (+) fall. Dx:mildly displaced peritrochanteric periprosthetic femur fracture and UTI. Open reduction w/ internal fixation (ORIF) femur performed 02/01/2024. PMHx: Dementia, hypothyroidism, hx. total hip replacement, hx. R knee replacement.   Family/Caregiver Present No   ADL   Where Assessed Edge of bed   Eating Assistance 5  Supervision/Setup   Grooming Assistance 4  Minimal Assistance   UB Bathing Assistance 4  Minimal Assistance   LB Bathing Assistance 2  Maximal Assistance   UB Dressing Assistance 4  Minimal Assistance   LB Dressing Assistance 2  Maximal Assistance   Toileting Assistance  2  Maximal Assistance   Toileting Deficit Steadying;Verbal cueing;Supervison/safety;Increased time to complete;Bedside commode;Perineal hygiene  (C RW for B/L UE support; posterior care; VC for safe hand placemnets and body mechanics)   Functional Assistance 2  Maximal Assistance   Functional Deficit Steadying;Verbal cueing;Impulsive;Supervision/safety;Increased time to  complete;Commode transfer  (VC needed for RW management, safety awareness, safe hand placements, sequencing, and body mechanics; pt limited by pain and quick to fatigue)   Additional Comments eating, grooming, bathing, dressing not formally assessed. the above levels are anticipated based on functional pefromance and clinical judgement.   Bed Mobility   Supine to Sit 3  Moderate assistance   Additional items Assist x 1;HOB elevated;Bedrails;Increased time required;Verbal cues  (Trunk support; VC needed for hand placements and body mechanics)   Additional Comments pt received supine in bed. Pt left OOB in recliner   Transfers   Sit to Stand 2  Maximal assistance   Additional items Assist x 1;HOB elevated;Increased time required;Verbal cues  (c RW for B/L UE support)   Stand to Sit 2  Maximal assistance   Additional items Armrests;Increased time required;Impulsive;Verbal cues;Assist x 2  (c RW for B/L UE support)   Stand pivot 2  Maximal assistance   Additional items Assist x 2;Increased time required;Verbal cues  (c RW for B/L UE support)   Toilet transfer 2  Maximal assistance   Additional items Assist x 2;Increased time required;Verbal cues;Commode  (c RW)   Additional Comments STS x 2 initially Max x 1 transitioning to Max A x 2. stand to sit x 2 initially Mod A x2 transitioning to Max A x 2. Pt impulsive to sit and requires physical assistance for RW management, VC for body mechanics, safety awareness, and controlled descent.   Functional Mobility   Functional Mobility 2  Maximal assistance   Additional Comments A x 2 ; pt attempted ambulating transfer; Knee buckling noted x2 limited by pain and quick to fatigue. deffered to SPT   Additional items Rolling walker   Balance   Static Sitting Fair   Dynamic Sitting Fair -   Static Standing Zero  (c RW; A x 2)   Dynamic Standing   (Zero ; c RW; A x 2)   Activity Tolerance   Activity Tolerance Patient limited by fatigue;Patient limited by pain   Medical Staff Made  Aware KIMBERLI Urena; Care coordinated with PT JORGE; Ortho YFN Silva at bedside   Nurse Made Aware WILLY Epps   RUANDIE Assessment   RUE Assessment WFL  (MMT 3+/5 throughout. Shoulder flexion ~130)   LUE Assessment   LUE Assessment WFL  (MMT 3+/5 throughout. Shoulder flexion ~140)   Hand Function   Gross Motor Coordination Functional   Fine Motor Coordination Functional   Sensation   Light Touch No apparent deficits   Vision-Basic Assessment   Current Vision Wears glasses only for reading   Patient Visual Report   (glasses not at bedside)   Vision - Complex Assessment   Acuity Able to read clock/calendar on wall without difficulty;Able to read employee name badge without difficulty   Cognition   Overall Cognitive Status Impaired   Arousal/Participation Alert;Cooperative;Persistent stimuli required   Attention Attends with cues to redirect   Orientation Level Oriented to person;Oriented to situation;Oriented to time  (pt ID by name and ; (+) year and month)   Memory Decreased short term memory   Following Commands Follows one step commands with increased time or repetition   Comments dementia at baseline   Assessment   Limitation Decreased ADL status;Decreased UE strength;Decreased Safe judgement during ADL  (balance, functional reach , sequencing, safety awareness, trunk control, activity tolerance, standing tolerance)   Prognosis Fair   Assessment Pt is a 88 year old female seen by occupational therapy at [facility] following admission on 2024 d/t fall. Dx; mildly displaced peritrochanteric periprosthetic femur fracture and UTI. Open reduction w/ internal fixation (ORIF) femur performed 2024. Please see above for comprehensive list of PMHx impacting functional performance. Upon evaluation pt is performing below functional baseline. Pt presents with the following deficits decreased balance, decreased strength, decreased activity tolerance, decreased trunk control, decreased standing tolerance,  "decreased endurance, decreased sequencing, decreased safety awareness. Pt occupational performance is supported by accessible environment, PLOF, 24 hour care, staff/facility assistance. Barriers to discharge include A needed for ADL's and functional mobility, (+) falls, A x 2 for mobility. Pt would benefit from skilled therapy to address ADL retraining, functional transfer training, balance, endurance, activity engagement, education for compensatory dressing to maximize functional performance and decrease caregiver burden. At time of discharge pt is recommended level ll moderate resource intensity.   Goals   Patient Goals \"to get home and be able to walk down the halls\"   Plan   Treatment Interventions ADL retraining;Functional transfer training;UE strengthening/ROM;Endurance training;Patient/family training;Equipment evaluation/education;Compensatory technique education;Activityengagement   Goal Expiration Date 02/12/24   OT Treatment Day 0   OT Frequency 3-5x/wk   Discharge Recommendation   Rehab Resource Intensity Level, OT II (Moderate Resource Intensity)   Additional Comments  The patient's raw score on the AM-PAC Daily Activity Inpatient Short Form is 16. A raw score of less than 19 suggests the patient may benefit from discharge to post-acute rehabilitation services. Please refer to the recommendation of the Occupational Therapist for safe discharge planning.   AM-PAC Daily Activity Inpatient   Lower Body Dressing 2   Bathing 2   Toileting 2   Upper Body Dressing 3   Grooming 3   Eating 4   Daily Activity Raw Score 16   Daily Activity Standardized Score (Calc for Raw Score >=11) 35.96   AM-PAC Applied Cognition Inpatient   Following a Speech/Presentation 2   Understanding Ordinary Conversation 3   Taking Medications 1   Remembering Where Things Are Placed or Put Away 2   Remembering List of 4-5 Errands 1   Taking Care of Complicated Tasks 1   Applied Cognition Raw Score 10   Applied Cognition Standardized " Score 24.98   End of Consult   Education Provided Yes   Patient Position at End of Consult Bedside chair;Bed/Chair alarm activated;All needs within reach   Nurse Communication Nurse aware of consult   Goals to maximize occupational performance and decrease caregiver burden     Pt will tolerate therapeutic activity for 30 minutes to increase activity tolerance for ADL participation within 10 days     Pt will tolerate sitting up in the recliner chair 2-3 hours per day to increase activity engagement for ADL participation within 10 days     Pt will perform functional mobility with Max A x 1 short distances c RW to increase occupational performance and reduce caregiver burden for ADL's within 10 days    Pt will perform toileting with Mod A increase occupational performance and decrease caregiver burden for ADL's within 10 days     Pt will perform LB ADL's with Mod A and LHAE to maximize occupational performance and increase independence for ADL's within 10 days    Pt will perform UB ADL's with S for safety to maximize occupational performance and increase independence for ADL's within 10 days     Karen Davis

## 2024-02-02 NOTE — CONSULTS
Please see progress note from today by this provider for formal details of encounter.    MELISA Dye  Acute Pain Service

## 2024-02-02 NOTE — OCCUPATIONAL THERAPY NOTE
Goals :     Pt will tolerate therapeutic activity for 30 minutes to increase activity tolerance for Adl participation within 10 days

## 2024-02-03 PROCEDURE — 99024 POSTOP FOLLOW-UP VISIT: CPT | Performed by: ORTHOPAEDIC SURGERY

## 2024-02-03 PROCEDURE — 99232 SBSQ HOSP IP/OBS MODERATE 35: CPT | Performed by: SURGERY

## 2024-02-03 RX ADMIN — LIDOCAINE 5% 1 PATCH: 700 PATCH TOPICAL at 08:27

## 2024-02-03 RX ADMIN — OXYCODONE HYDROCHLORIDE 5 MG: 5 TABLET ORAL at 16:20

## 2024-02-03 RX ADMIN — POLYETHYLENE GLYCOL 3350 17 G: 17 POWDER, FOR SOLUTION ORAL at 08:27

## 2024-02-03 RX ADMIN — LEVOTHYROXINE SODIUM 25 MCG: 25 TABLET ORAL at 05:30

## 2024-02-03 RX ADMIN — DONEPEZIL HYDROCHLORIDE 10 MG: 5 TABLET ORAL at 21:13

## 2024-02-03 RX ADMIN — ENOXAPARIN SODIUM 30 MG: 30 INJECTION SUBCUTANEOUS at 08:27

## 2024-02-03 RX ADMIN — ACETAMINOPHEN 975 MG: 325 TABLET, FILM COATED ORAL at 16:11

## 2024-02-03 RX ADMIN — ENOXAPARIN SODIUM 30 MG: 30 INJECTION SUBCUTANEOUS at 21:13

## 2024-02-03 RX ADMIN — ACETAMINOPHEN 975 MG: 325 TABLET, FILM COATED ORAL at 21:13

## 2024-02-03 RX ADMIN — SENNOSIDES, DOCUSATE SODIUM 1 TABLET: 8.6; 5 TABLET ORAL at 21:13

## 2024-02-03 RX ADMIN — GABAPENTIN 100 MG: 100 CAPSULE ORAL at 21:13

## 2024-02-03 RX ADMIN — CEFTRIAXONE SODIUM 1000 MG: 10 INJECTION, POWDER, FOR SOLUTION INTRAVENOUS at 08:28

## 2024-02-03 RX ADMIN — ACETAMINOPHEN 975 MG: 325 TABLET, FILM COATED ORAL at 05:30

## 2024-02-03 NOTE — PROGRESS NOTES
[facility]  Progress Note  Name: Jefry Mcclure I  MRN: [id number]  Unit/Bed#: W -01 I Date of Admission: 1/31/2024   Date of Service: 2/3/2024 I Hospital Day: 3    Assessment/Plan   Fall  Assessment & Plan  Status post fall with the below noted injuries.  Fall precautions.  Geriatric Medicine consultation for evaluation, medication review and recommendations.  PT and OT evaluation and treatment as indicated.  Case Management consultation for disposition planning.      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  Appreciate Orthopedic surgery evaluation, recommendations and interventions as noted.  2/1--planning of OR today  Maintain non weightbearing status on the right lower extremity.  MISSY drain in place--85ml Output overnight  Monitor right lower extremity neurovascular exam.  Continue multimodal analgesic regimen.  Continue DVT prophylaxis.  PT and OT evaluation and treatment as indicated.  Outpatient follow up with Orthopedic surgery for re-evaluation.      UTI (urinary tract infection)  Assessment & Plan  POA--admission UA positive for Nitrates and WBCs  Treat with 3 day course of rocephin.   Continue to monitor.     History of dementia  Assessment & Plan  Continue current medication regimen.  Outpatient follow-up with PCP.  Geriatrics consulted    History of hypothyroidism  Assessment & Plan  Continue current medication regimen.  Outpatient follow-up with PCP.      Acute pain due to trauma  Assessment & Plan  Acute pain secondary to traumatic injuries.  Continue multimodal analgesic regimen.  Bowel regimen as long as using opioids.  Continue to monitor pain and adjust regimen as indicated.               Bowel Regimen: Miralax  VTE Prophylaxis:Sequential compression device (Venodyne)  and Enoxaparin (Lovenox)     Disposition: Pending placement and MISYS drain  "removal    Subjective   Chief Complaint: \"I am just sore\"    Subjective: Patient notes being sore in her right hip, but otherwise describes feeling well.  She states that she has not had a bowel movement though is passing gas and denies any abdominal tenderness.  She confirms that she has been tolerating her diet and getting sleep overnight.  No other complaints offered.     Objective   Vitals:   Temp:  [98 °F (36.7 °C)-98.7 °F (37.1 °C)] 98 °F (36.7 °C)  HR:  [77-88] 83  Resp:  [16-18] 18  BP: (103-119)/(49-55) 115/55    I/O         02/01 0701  02/02 0700 02/02 0701  02/03 0700 02/03 0701  02/04 0700    P.O. 237 540     I.V. (mL/kg)  826.7 (9.7)     IV Piggyback 150      Total Intake(mL/kg) 387 (4.5) 1366.7 (16)     Urine (mL/kg/hr) 0 (0)      Drains 125 85     Total Output 125 85     Net +262 +1281.7            Unmeasured Urine Occurrence 2 x 1 x              Physical Exam:   GENERAL APPEARANCE: No acute distress.  NEURO: GCS is 15.  Light touch sensation intact in all extremities.  HEENT: Normocephalic, atraumatic.  Neck supple.  CV: Regular rate and rhythm.  +2 radial and dorsalis pedis pulses, bilaterally.  LUNGS: Clear to auscultation, bilaterally.  Chest wall is nontender on palpation.  GI: Abdomen is soft and nontender.  : Pelvis is stable.  Bladder is nontender.  MSK: Right lower extremity has limited range of motion.  All other extremities display full range of motion.  SKIN: Warm, dry.    Invasive Devices       Peripheral Intravenous Line  Duration             Peripheral IV 01/31/24 Left Antecubital 2 days    Peripheral IV 02/01/24 Right Hand 1 day              Drain  Duration             Closed/Suction Drain Right Thigh Bulb 15 Fr. 1 day                          Lab Results: Results: I have personally reviewed all pertinent laboratory/tests results, BMP/CMP: No results found for: \"SODIUM\", \"K\", \"CL\", \"CO2\", \"ANIONGAP\", \"BUN\", \"CREATININE\", \"GLUCOSE\", \"CALCIUM\", \"AST\", \"ALT\", \"ALKPHOS\", \"PROT\", " "\"BILITOT\", \"EGFR\", and CBC: No results found for: \"WBC\", \"HGB\", \"HCT\", \"MCV\", \"PLT\", \"ADJUSTEDWBC\", \"RBC\", \"MCH\", \"MCHC\", \"RDW\", \"MPV\", \"NRBC\"  Imaging: I have personally reviewed pertinent reports.     Other Studies: none     Called patient's son, Tavo, no answer, voice message left.  "

## 2024-02-03 NOTE — PLAN OF CARE
Problem: Nutrition/Hydration-ADULT  Goal: Nutrient/Hydration intake appropriate for improving, restoring or maintaining nutritional needs  Description: Monitor and assess patient's nutrition/hydration status for malnutrition. Collaborate with interdisciplinary team and initiate plan and interventions as ordered.  Monitor patient's weight and dietary intake as ordered or per policy. Utilize nutrition screening tool and intervene as necessary. Determine patient's food preferences and provide high-protein, high-caloric foods as appropriate.     INTERVENTIONS:  - Monitor oral intake, urinary output, labs, and treatment plans  - Assess nutrition and hydration status and recommend course of action  - Evaluate amount of meals eaten  - Assist patient with eating if necessary   - Allow adequate time for meals  - Recommend/ encourage appropriate diets, oral nutritional supplements, and vitamin/mineral supplements  - Order, calculate, and assess calorie counts as needed  - Recommend, monitor, and adjust tube feedings and TPN/PPN based on assessed needs  - Assess need for intravenous fluids  - Provide specific nutrition/hydration education as appropriate  - Include patient/family/caregiver in decisions related to nutrition  Outcome: Progressing     Problem: PAIN - ADULT  Goal: Verbalizes/displays adequate comfort level or baseline comfort level  Description: Interventions:  - Encourage patient to monitor pain and request assistance  - Assess pain using appropriate pain scale  - Administer analgesics based on type and severity of pain and evaluate response  - Implement non-pharmacological measures as appropriate and evaluate response  - Consider cultural and social influences on pain and pain management  - Notify physician/advanced practitioner if interventions unsuccessful or patient reports new pain  Outcome: Progressing     Problem: INFECTION - ADULT  Goal: Absence or prevention of progression during  hospitalization  Description: INTERVENTIONS:  - Assess and monitor for signs and symptoms of infection  - Monitor lab/diagnostic results  - Monitor all insertion sites, i.e. indwelling lines, tubes, and drains  - Monitor endotracheal if appropriate and nasal secretions for changes in amount and color  - [place] appropriate cooling/warming therapies per order  - Administer medications as ordered  - Instruct and encourage patient and family to use good hand hygiene technique  - Identify and instruct in appropriate isolation precautions for identified infection/condition  Outcome: Progressing  Goal: Absence of fever/infection during neutropenic period  Description: INTERVENTIONS:  - Monitor WBC    Outcome: Progressing     Problem: SAFETY ADULT  Goal: Patient will remain free of falls  Description: INTERVENTIONS:  - Educate patient/family on patient safety including physical limitations  - Instruct patient to call for assistance with activity   - Consult OT/PT to assist with strengthening/mobility   - Keep Call bell within reach  - Keep bed low and locked with side rails adjusted as appropriate  - Keep care items and personal belongings within reach  - Initiate and maintain comfort rounds  - Make Fall Risk Sign visible to staff  - Offer Toileting every  Hours, in advance of need  - Initiate/Maintain alarm  - Obtain necessary fall risk management equipment:   - Apply yellow socks and bracelet for high fall risk patients  - Consider moving patient to room near nurses station  Outcome: Progressing  Goal: Maintain or return to baseline ADL function  Description: INTERVENTIONS:  -  Assess patient's ability to carry out ADLs; assess patient's baseline for ADL function and identify physical deficits which impact ability to perform ADLs (bathing, care of mouth/teeth, toileting, grooming, dressing, etc.)  - Assess/evaluate cause of self-care deficits   - Assess range of motion  - Assess patient's mobility; develop plan if  impaired  - Assess patient's need for assistive devices and provide as appropriate  - Encourage maximum independence but intervene and supervise when necessary  - Involve family in performance of ADLs  - Assess for home care needs following discharge   - Consider OT consult to assist with ADL evaluation and planning for discharge  - Provide patient education as appropriate  Outcome: Progressing  Goal: Maintains/Returns to pre admission functional level  Description: INTERVENTIONS:  - Perform AM-PAC 6 Click Basic Mobility/ Daily Activity assessment daily.  - Set and communicate daily mobility goal to care team and patient/family/caregiver.   - Collaborate with rehabilitation services on mobility goals if consulted  - Perform Range of Motion  times a day.  - Reposition patient every  hours.  - Dangle patient times a day  - Stand patient  times a day  - Ambulate patient  times a day  - Out of bed to chair  times a day   - Out of bed for meals  times a day  - Out of bed for toileting  - Record patient progress and toleration of activity level   Outcome: Progressing     Problem: DISCHARGE PLANNING  Goal: Discharge to home or other facility with appropriate resources  Description: INTERVENTIONS:  - Identify barriers to discharge w/patient and caregiver  - Arrange for needed discharge resources and transportation as appropriate  - Identify discharge learning needs (meds, wound care, etc.)  - Arrange for interpretive services to assist at discharge as needed  - Refer to Case Management Department for coordinating discharge planning if the patient needs post-hospital services based on physician/advanced practitioner order or complex needs related to functional status, cognitive ability, or social support system  Outcome: Progressing     Problem: Knowledge Deficit  Goal: Patient/family/caregiver demonstrates understanding of disease process, treatment plan, medications, and discharge instructions  Description: Complete  learning assessment and assess knowledge base.  Interventions:  - Provide teaching at level of understanding  - Provide teaching via preferred learning methods  Outcome: Progressing     Problem: Prexisting or High Potential for Compromised Skin Integrity  Goal: Skin integrity is maintained or improved  Description: INTERVENTIONS:  - Identify patients at risk for skin breakdown  - Assess and monitor skin integrity  - Assess and monitor nutrition and hydration status  - Monitor labs   - Assess for incontinence   - Turn and reposition patient  - Assist with mobility/ambulation  - Relieve pressure over bony prominences  - Avoid friction and shearing  - Provide appropriate hygiene as needed including keeping skin clean and dry  - Evaluate need for skin moisturizer/barrier cream  - Collaborate with interdisciplinary team   - Patient/family teaching  - Consider wound care consult   Outcome: Progressing

## 2024-02-03 NOTE — CASE MANAGEMENT
Case Management Progress Note    Patient name Jefry Mcclure  Location W /W -01 MRN [id number]  : 1935 Date 2/3/2024       LOS (days): 3  Geometric Mean LOS (GMLOS) (days): 2.8  Days to GMLOS:-0.3        OBJECTIVE:        Current admission status: Inpatient  Preferred Pharmacy: No Pharmacies Listed  Primary Care Provider: Brenda Conway MD    Primary Insurance: MEDICARE  Secondary Insurance: [employer]    PROGRESS NOTE:    Cm attempted phone call to patients son. There was no response- Cm left Vm requesting returned call regarding Patients discharge plan.

## 2024-02-03 NOTE — ASSESSMENT & PLAN NOTE
Periprosthetic hip fracture of right side, present on admission.  Appreciate Orthopedic surgery evaluation, recommendations and interventions as noted.  2/1--planning of OR today  Maintain non weightbearing status on the right lower extremity.  MISSY drain in place--85ml Output overnight  Monitor right lower extremity neurovascular exam.  Continue multimodal analgesic regimen.  Continue DVT prophylaxis.  PT and OT evaluation and treatment as indicated.  Outpatient follow up with Orthopedic surgery for re-evaluation.

## 2024-02-03 NOTE — PROGRESS NOTES
"Patient was being transferred from chair to bedside commode when her right leg gave out. She was asked if she feels she can stand and hold onto the walker while I switched the chair from behind her to commode. She responded \"yes\" but as I was getting ready to switch she said she couldn't hold on no more in which I helped her sit onto the floor. Negative for any head strike. She was placed back on to the bed in which she used the bedpan instead. I notified Trauma AP German in which he came upstairs with the team. Patient was comfortably resting in bed with no complaints of any further or worsened pain.  "

## 2024-02-04 VITALS
SYSTOLIC BLOOD PRESSURE: 110 MMHG | RESPIRATION RATE: 18 BRPM | WEIGHT: 184.75 LBS | OXYGEN SATURATION: 91 % | HEART RATE: 79 BPM | HEIGHT: 64 IN | DIASTOLIC BLOOD PRESSURE: 58 MMHG | BODY MASS INDEX: 31.54 KG/M2 | TEMPERATURE: 98 F

## 2024-02-04 PROCEDURE — 99024 POSTOP FOLLOW-UP VISIT: CPT | Performed by: PHYSICIAN ASSISTANT

## 2024-02-04 PROCEDURE — NC001 PR NO CHARGE: Performed by: NURSE PRACTITIONER

## 2024-02-04 PROCEDURE — 99238 HOSP IP/OBS DSCHRG MGMT 30/<: CPT | Performed by: NURSE PRACTITIONER

## 2024-02-04 RX ORDER — LIDOCAINE 50 MG/G
1 PATCH TOPICAL DAILY
Start: 2024-02-05

## 2024-02-04 RX ORDER — GABAPENTIN 100 MG/1
100 CAPSULE ORAL
Start: 2024-02-04

## 2024-02-04 RX ORDER — AMOXICILLIN 250 MG
1 CAPSULE ORAL
Start: 2024-02-04

## 2024-02-04 RX ORDER — ACETAMINOPHEN 325 MG/1
975 TABLET ORAL EVERY 8 HOURS PRN
Start: 2024-02-04

## 2024-02-04 RX ORDER — ENOXAPARIN SODIUM 100 MG/ML
40 INJECTION SUBCUTANEOUS DAILY
Start: 2024-02-04 | End: 2024-03-03

## 2024-02-04 RX ORDER — OXYCODONE HYDROCHLORIDE 5 MG/1
TABLET ORAL
Qty: 20 TABLET | Refills: 0 | Status: SHIPPED | OUTPATIENT
Start: 2024-02-04

## 2024-02-04 RX ADMIN — ACETAMINOPHEN 975 MG: 325 TABLET, FILM COATED ORAL at 14:13

## 2024-02-04 RX ADMIN — ENOXAPARIN SODIUM 30 MG: 30 INJECTION SUBCUTANEOUS at 08:57

## 2024-02-04 RX ADMIN — POLYETHYLENE GLYCOL 3350 17 G: 17 POWDER, FOR SOLUTION ORAL at 08:57

## 2024-02-04 RX ADMIN — LEVOTHYROXINE SODIUM 25 MCG: 25 TABLET ORAL at 06:17

## 2024-02-04 RX ADMIN — LIDOCAINE 5% 1 PATCH: 700 PATCH TOPICAL at 08:57

## 2024-02-04 RX ADMIN — ACETAMINOPHEN 975 MG: 325 TABLET, FILM COATED ORAL at 06:17

## 2024-02-04 NOTE — CASE MANAGEMENT
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     CM updated nursing, receiving facility, trauma and patient's son Tavo with  time.

## 2024-02-04 NOTE — CASE MANAGEMENT
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     CM contacted [facility] Nursing Supervisor at . Spoke with Sarah. CM introduced self and role. Nursing supervisor updated patient is medically stable for discharge today. Facility is able to accept. CM will f/u with transport time.     CM spoke with patient's son Tavo at . CM introduced self and role. Tavo updated per trauma, patient is medically stable for discharge today back to [facility]. CM working on transport for between [id number] for return to [place] [address]. CM will f/u with patient's son  time once received. All questions/concerns answered at this time.     CM updated nursing and trauma with plan of care.     CM sent referral via [facility] for BLS. Waiting for  time.

## 2024-02-04 NOTE — PROGRESS NOTES
[facility]  Progress Note  Name: Jefry Mcclure I  MRN: [id number]  Unit/Bed#: W -01 I Date of Admission: 1/31/2024   Date of Service: 2/4/2024 I Hospital Day: 4    Assessment/Plan   Fall  Assessment & Plan  Status post fall with the below noted injuries.  Fall precautions.  Geriatric Medicine consultation for evaluation, medication review and recommendations.  PT and OT evaluation and treatment as indicated.  Case Management consultation for disposition planning.      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  Appreciate Orthopedic surgery evaluation, recommendations and interventions as noted.  2/1--planning of OR today  Maintain non weightbearing status on the right lower extremity.  MISSY drain in place--75ml Output overnight  Removed by Ortho today  Monitor right lower extremity neurovascular exam.  Continue multimodal analgesic regimen.  Continue DVT prophylaxis.  PT and OT evaluation and treatment as indicated.  Outpatient follow up with Orthopedic surgery for re-evaluation.      UTI (urinary tract infection)  Assessment & Plan  POA--admission UA positive for Nitrates and WBCs  Treated with 3 day course of rocephin.   Continue to monitor.     History of dementia  Assessment & Plan  Continue current medication regimen.  Outpatient follow-up with PCP.  Geriatrics consulted    History of hypothyroidism  Assessment & Plan  Continue current medication regimen.  Outpatient follow-up with PCP.      Acute pain due to trauma  Assessment & Plan  Acute pain secondary to traumatic injuries.  Continue multimodal analgesic regimen.  Bowel regimen as long as using opioids.  Continue to monitor pain and adjust regimen as indicated.               Bowel Regimen: Senna S  VTE Prophylaxis:Sequential compression device (Venodyne)  and Enoxaparin (Lovenox)     Disposition: Pending  "placement    Subjective   Chief Complaint: \"I feel fine\"    Subjective: She describes feeling well overall today.  She notes that the drain was taken out of her leg and she is a little for in her right leg. She confirms that she has been tolerating her diet and denies having any other complaints.     Objective   Vitals:   HR:  [79-95] 79  BP: (102-110)/(48-58) 110/58    I/O         02/02 0701  02/03 0700 02/03 0701  02/04 0700 02/04 0701  02/05 0700    P.O. 540  240    I.V. (mL/kg) 826.7 (9.7)      IV Piggyback       Total Intake(mL/kg) 1366.7 (16)  240 (2.9)    Urine (mL/kg/hr)       Drains 85 75     Total Output 85 75     Net +1281.7 -75 +240           Unmeasured Urine Occurrence 1 x               Physical Exam:   GENERAL APPEARANCE: NAD  NEURO: GCS 15  HEENT: NC, AT. Neck supple.  CV: RRR, +2 radial and DP pulses  LUNGS: CTA, BL. Chest is nontender.   GI: Abdomen is soft and nontender  : Pelvis is stable  MSK: Right hip is swollen and mildly tender. MISSY removed.  Surgical dressings are clean, dry, intact.  Patient is able to fully range right ankle and foot.  Limited range of motion of right knee/hip.  All other extremities display no deformities or limited range of motion.  SKIN: warm, dry.    Invasive Devices       Peripheral Intravenous Line  Duration             Peripheral IV 01/31/24 Left Antecubital 3 days    Peripheral IV 02/01/24 Right Hand 2 days              Drain  Duration             Closed/Suction Drain Right Thigh Bulb 15 Fr. 2 days                          Lab Results: Results: I have personally reviewed all pertinent laboratory/tests results, BMP/CMP: No results found for: \"SODIUM\", \"K\", \"CL\", \"CO2\", \"ANIONGAP\", \"BUN\", \"CREATININE\", \"GLUCOSE\", \"CALCIUM\", \"AST\", \"ALT\", \"ALKPHOS\", \"PROT\", \"BILITOT\", \"EGFR\", and CBC: No results found for: \"WBC\", \"HGB\", \"HCT\", \"MCV\", \"PLT\", \"ADJUSTEDWBC\", \"RBC\", \"MCH\", \"MCHC\", \"RDW\", \"MPV\", \"NRBC\"  Imaging: I have personally reviewed pertinent reports.     Other " Studies: none     Called patient's son. No answer.

## 2024-02-04 NOTE — DISCHARGE SUMMARY
"  Discharge Summary - Jefry Mcclure 88 y.o. female MRN: [id number]    Unit/Bed#: W -01 Encounter: [id number]    Admission Date:   Admission Orders (From admission, onward)       Ordered        01/31/24 1402  Inpatient Admission  Once                            Admitting Diagnosis: Weakness [R53.1]  Dementia ([facility]) [F03.90]  Periprosthetic fracture around internal prosthetic right hip joint, initial encounter ([facility]) [M97.01XA]  Degenerative arthritis [M19.90]    HPI: \"Jefry Mcclure is a 88 y.o. female with pmhx of hypothyroidism and dementia who presents from facility after mechanical fall on 1/30. Patient is not on any anticoagulation or antiplatelet medications. She states she fell a few days ago, per son facility called today to let him know she fell 1/30 PM. She denies headstrike. She otherwise denies loss of consciousness. She denies lightheadedness or dizziness prior to and after fall. She denies chest pain, sob, abdominal pain. Per son, she has no major cardiac or pulmonary past medical history, only medications she takes per son and facility paperwork is levothyroxine and aricept aside from vitamins.\" _Per Ron Oakes's H&P on 1/31/2024    Procedures Performed:   Orders Placed This Encounter   Procedures    Fast Ultrasound    POC Cardiac US    POC Lung US    POC Renal US       Summary of Hospital Course: This an 88-year-old female who suffered a fall resulting in a right hip fracture.  She was admitted to the trauma service with orthopedic consultation.  On 2/1, patient was taken to the OR with orthopedics for ORIF of her right hip.  She was evaluated by PT/OT postoperatively and was cleared to return back to [facility].  Her hospitalization was extended due to MISSY drain that was eventually removed on 2/3.  Of note, patient was noted to have a urinary tract infection was treated with a 3-day course of Rocephin.  Prior to discharge her vital signs and laboratory findings have been stable for the past " several days.  She will continue on Lovenox for DVT prophylaxis and follow-up with orthopedics in 2 weeks.    Significant Findings, Care, Treatment and Services Provided:   XR femur 2 vw right    Result Date: 2/1/2024  Impression: Fluoroscopic guidance provided for procedure guidance.  Please refer to the separate procedure notes for additional details. Workstation performed: [id number]     XR chest portable    Result Date: 2/1/2024  Impression: No active pulmonary disease on examination which is somewhat limited secondary to low lung volumes. Workstation performed: [id number]     XR femur 2 vw right    Result Date: 2/1/2024  Impression: Periprosthetic fracture at the base of the right greater trochanter without significant displacement, and also the right subtrochanteric region with displacement. Findings concur with the preliminary report by the referring clinician already in PACS and/or our electronic record EPIC. Workstation performed: [id number]     CT spine cervical wo contrast    Result Date: 1/31/2024  Impression: No acute compression collapse of the vertebral Multilevel degenerative disc disease with central narrowing at C3-4, C4-5,, C6/7 Workstation performed: [id number]     CT head without contrast    Result Date: 1/31/2024  Impression: No acute intracranial abnormality. Workstation performed: [id number]     CT lower extremity wo contrast right    Result Date: 1/31/2024  Impression: Acute mildly displaced intertrochanteric/subtrochanteric periprosthetic femur fracture. Workstation performed: [id number]        Complications: none    Discharge Diagnosis: Fall, right hip fracture, UTI    Medical Problems       Resolved Problems  Date Reviewed: 2/4/2024   None         Condition at Discharge: good         Discharge instructions/Information to patient and family:   See after visit summary for information provided to patient and family.      Provisions for Follow-Up Care:  See after visit summary for  information related to follow-up care and any pertinent home health orders.      PCP: Brenda Conway MD    Disposition: See After Visit Summary for discharge disposition information.    Planned Readmission: No      Discharge Statement   I spent 25 minutes discharging the patient. This time was spent on the day of discharge. I had direct contact with the patient on the day of discharge. Additional documentation is required if more than 30 minutes were spent on discharge.     Discharge Medications:  See after visit summary for reconciled discharge medications provided to patient and family.

## 2024-02-04 NOTE — PROGRESS NOTES
Progress Note - Orthopedics   Jefry Mcclure 88 y.o. female MRN: [id number]  Unit/Bed#: W -01      Subjective:    88 y.o.female seen and evaluated at bedside.  She states that her pain is well-controlled at this time.  She offers no other complaints at this time.  She denies any chest pain, shortness of breath, nausea, vomiting, diarrhea, lightheadedness or dizziness.    Labs:  0   Lab Value Date/Time    HCT 30.0 (L) 02/02/2024 0559    HCT 33.5 (L) 02/01/2024 0457    HCT 37.7 01/31/2024 1352    HGB 10.0 (L) 02/02/2024 0559    HGB 11.2 (L) 02/01/2024 0457    HGB 12.4 01/31/2024 1352    INR 1.09 01/31/2024 1623    WBC 12.79 (H) 02/02/2024 0559    WBC 7.34 02/01/2024 0457    WBC 10.56 (H) 01/31/2024 1352       Meds:    Current Facility-Administered Medications:     acetaminophen (TYLENOL) tablet 975 mg, 975 mg, Oral, Q8H MYNOR, YFN Vidal-C, 975 mg at 02/04/24 0617    donepezil (ARICEPT) tablet 10 mg, 10 mg, Oral, HS, Amado Ghosh PA-C, 10 mg at 02/03/24 2113    enoxaparin (LOVENOX) subcutaneous injection 30 mg, 30 mg, Subcutaneous, Q12H MYNOR, Amado Ghosh PA-C, 30 mg at 02/03/24 2113    gabapentin (NEURONTIN) capsule 100 mg, 100 mg, Oral, HS, Amado Ghosh PA-C, 100 mg at 02/03/24 2113    HYDROmorphone HCl (DILAUDID) injection 0.2 mg, 0.2 mg, Intravenous, Q2H PRN, YFN Vidal-C, 0.2 mg at 02/01/24 2114    levothyroxine tablet 25 mcg, 25 mcg, Oral, Early Morning, YFN Vidal-C, 25 mcg at 02/04/24 0617    lidocaine (LIDODERM) 5 % patch 1 patch, 1 patch, Topical, Daily, Amado Ghosh PA-C, 1 patch at 02/03/24 0827    naloxone (NARCAN) 0.04 mg/mL syringe 0.04 mg, 0.04 mg, Intravenous, Q1MIN PRN, Amado Ghosh PA-C    oxyCODONE (ROXICODONE) split tablet 2.5 mg, 2.5 mg, Oral, Q4H PRN, 2.5 mg at 02/02/24 0567 **OR** oxyCODONE (ROXICODONE) IR tablet 5 mg, 5 mg, Oral, Q4H PRN, Amado Ghosh PA-C, 5 mg at 02/03/24 1620    polyethylene glycol (MIRALAX) packet 17 g, 17 g, Oral, Daily, Amado Ghosh PA-C, 17 g at  "02/03/24 0827    senna-docusate sodium (SENOKOT S) 8.6-50 mg per tablet 1 tablet, 1 tablet, Oral, HS, Amado Ghosh PA-C, 1 tablet at 02/03/24 2113    Blood Culture:   No results found for: \"BLOODCX\"    Wound Culture:   No results found for: \"WOUNDCULT\"    Ins and Outs:  I/O last 24 hours:  In: -   Out: 75 [Drains:75]          Physical:  Vitals:    02/04/24 0326   BP: (!) 102/48   Pulse: 93   Resp:    Temp:    SpO2: 91%     Musculoskeletal: right Lower Extremity  Surgical dressings c/d/I without strike through  Drain in place. 25cc last shift, drain was removed today without complication and gauze and tegaderm were applied to the wound  Thigh and calf soft and compressible.   Sensation intact to saphenous, sural, tibial, superficial peroneal nerve, and deep peroneal  Motor intact to +FHL/EHL, +ankle dorsi/plantar flexion  2+ DP pulse  Digits warm and well perfused  Capillary refill < 2 seconds    Assessment:    88 y.o.female s/p open reduction internal fixation of right periprosthetic femur fracture with Dr. Evangelista 2/1/24 (POD 3) .     Plan:  WBAT to the right lower extremity.   Drain was removed without complications today.  PT/OT  Pain control per primary team.   DVT ppx: lovenox x 4 weeks.   Medical management per primary team.   Dispo: Ortho will follow  Should follow up with Dr. Evangelista upon discharge.     Pino Schilling PA-C      "

## 2024-02-04 NOTE — ASSESSMENT & PLAN NOTE
POA--admission UA positive for Nitrates and WBCs  Treated with 3 day course of rocephin.   Continue to monitor.

## 2024-02-04 NOTE — ASSESSMENT & PLAN NOTE
Periprosthetic hip fracture of right side, present on admission.  Appreciate Orthopedic surgery evaluation, recommendations and interventions as noted.  2/1--planning of OR today  Maintain non weightbearing status on the right lower extremity.  MISSY drain in place--75ml Output overnight  Removed by Ortho today  Monitor right lower extremity neurovascular exam.  Continue multimodal analgesic regimen.  Continue DVT prophylaxis.  PT and OT evaluation and treatment as indicated.  Outpatient follow up with Orthopedic surgery for re-evaluation.

## 2024-02-04 NOTE — PLAN OF CARE
Problem: Nutrition/Hydration-ADULT  Goal: Nutrient/Hydration intake appropriate for improving, restoring or maintaining nutritional needs  Description: Monitor and assess patient's nutrition/hydration status for malnutrition. Collaborate with interdisciplinary team and initiate plan and interventions as ordered.  Monitor patient's weight and dietary intake as ordered or per policy. Utilize nutrition screening tool and intervene as necessary. Determine patient's food preferences and provide high-protein, high-caloric foods as appropriate.     INTERVENTIONS:  - Monitor oral intake, urinary output, labs, and treatment plans  - Assess nutrition and hydration status and recommend course of action  - Evaluate amount of meals eaten  - Assist patient with eating if necessary   - Allow adequate time for meals  - Recommend/ encourage appropriate diets, oral nutritional supplements, and vitamin/mineral supplements  - Order, calculate, and assess calorie counts as needed  - Recommend, monitor, and adjust tube feedings and TPN/PPN based on assessed needs  - Assess need for intravenous fluids  - Provide specific nutrition/hydration education as appropriate  - Include patient/family/caregiver in decisions related to nutrition  2/4/2024 1138 by Liliane Mccollum RN  Outcome: Progressing  2/4/2024 1138 by Liliane Mccollum RN  Outcome: Progressing     Problem: PAIN - ADULT  Goal: Verbalizes/displays adequate comfort level or baseline comfort level  Description: Interventions:  - Encourage patient to monitor pain and request assistance  - Assess pain using appropriate pain scale  - Administer analgesics based on type and severity of pain and evaluate response  - Implement non-pharmacological measures as appropriate and evaluate response  - Consider cultural and social influences on pain and pain management  - Notify physician/advanced practitioner if interventions unsuccessful or patient reports new pain  2/4/2024 1138 by Liliane Mccollum  RN  Outcome: Progressing  2/4/2024 1138 by Liliane Mccollum RN  Outcome: Progressing     Problem: INFECTION - ADULT  Goal: Absence or prevention of progression during hospitalization  Description: INTERVENTIONS:  - Assess and monitor for signs and symptoms of infection  - Monitor lab/diagnostic results  - Monitor all insertion sites, i.e. indwelling lines, tubes, and drains  - Monitor endotracheal if appropriate and nasal secretions for changes in amount and color  - [place] appropriate cooling/warming therapies per order  - Administer medications as ordered  - Instruct and encourage patient and family to use good hand hygiene technique  - Identify and instruct in appropriate isolation precautions for identified infection/condition  2/4/2024 1138 by Liliane Mccollum RN  Outcome: Progressing  2/4/2024 1138 by Liliane Mccollum RN  Outcome: Progressing  Goal: Absence of fever/infection during neutropenic period  Description: INTERVENTIONS:  - Monitor WBC    2/4/2024 1138 by Liliane Mccollum RN  Outcome: Progressing  2/4/2024 1138 by Liliane Mccollum RN  Outcome: Progressing     Problem: SAFETY ADULT  Goal: Patient will remain free of falls  Description: INTERVENTIONS:  - Educate patient/family on patient safety including physical limitations  - Instruct patient to call for assistance with activity   - Consult OT/PT to assist with strengthening/mobility   - Keep Call bell within reach  - Keep bed low and locked with side rails adjusted as appropriate  - Keep care items and personal belongings within reach  - Initiate and maintain comfort rounds  - Make Fall Risk Sign visible to staff  - Offer Toileting every  Hours, in advance of need  - Initiate/Maintain alarm  - Obtain necessary fall risk management equipment:  - Apply yellow socks and bracelet for high fall risk patients  - Consider moving patient to room near nurses station  2/4/2024 1138 by Liliane Mccollum RN  Outcome: Progressing  2/4/2024 1138 by Liliane Mccollum RN  Outcome:  Progressing  Goal: Maintain or return to baseline ADL function  Description: INTERVENTIONS:  -  Assess patient's ability to carry out ADLs; assess patient's baseline for ADL function and identify physical deficits which impact ability to perform ADLs (bathing, care of mouth/teeth, toileting, grooming, dressing, etc.)  - Assess/evaluate cause of self-care deficits   - Assess range of motion  - Assess patient's mobility; develop plan if impaired  - Assess patient's need for assistive devices and provide as appropriate  - Encourage maximum independence but intervene and supervise when necessary  - Involve family in performance of ADLs  - Assess for home care needs following discharge   - Consider OT consult to assist with ADL evaluation and planning for discharge  - Provide patient education as appropriate  2/4/2024 1138 by Liliane Mccollum RN  Outcome: Progressing  2/4/2024 1138 by Liliane Mccollum RN  Outcome: Progressing  Goal: Maintains/Returns to pre admission functional level  Description: INTERVENTIONS:  - Perform AM-PAC 6 Click Basic Mobility/ Daily Activity assessment daily.  - Set and communicate daily mobility goal to care team and patient/family/caregiver.   - Collaborate with rehabilitation services on mobility goals if consulted  - Perform Range of Motion  times a day.  - Reposition patient every  hours.  - Dangle patient  times a day  - Stand patient  times a day  - Ambulate patient  times a day  - Out of bed to chair  times a day   - Out of bed for meals  times a day  - Out of bed for toileting  - Record patient progress and toleration of activity level   2/4/2024 1138 by Liliane Mccollum RN  Outcome: Progressing  2/4/2024 1138 by Liliane Mccollum RN  Outcome: Progressing     Problem: DISCHARGE PLANNING  Goal: Discharge to home or other facility with appropriate resources  Description: INTERVENTIONS:  - Identify barriers to discharge w/patient and caregiver  - Arrange for needed discharge resources and transportation  as appropriate  - Identify discharge learning needs (meds, wound care, etc.)  - Arrange for interpretive services to assist at discharge as needed  - Refer to Case Management Department for coordinating discharge planning if the patient needs post-hospital services based on physician/advanced practitioner order or complex needs related to functional status, cognitive ability, or social support system  2/4/2024 1138 by Liliane Mccollum RN  Outcome: Progressing  2/4/2024 1138 by Liliane Mccollum RN  Outcome: Progressing     Problem: Knowledge Deficit  Goal: Patient/family/caregiver demonstrates understanding of disease process, treatment plan, medications, and discharge instructions  Description: Complete learning assessment and assess knowledge base.  Interventions:  - Provide teaching at level of understanding  - Provide teaching via preferred learning methods  2/4/2024 1138 by Liliane Mccollum RN  Outcome: Progressing  2/4/2024 1138 by Liliane Mccollum RN  Outcome: Progressing     Problem: Prexisting or High Potential for Compromised Skin Integrity  Goal: Skin integrity is maintained or improved  Description: INTERVENTIONS:  - Identify patients at risk for skin breakdown  - Assess and monitor skin integrity  - Assess and monitor nutrition and hydration status  - Monitor labs   - Assess for incontinence   - Turn and reposition patient  - Assist with mobility/ambulation  - Relieve pressure over bony prominences  - Avoid friction and shearing  - Provide appropriate hygiene as needed including keeping skin clean and dry  - Evaluate need for skin moisturizer/barrier cream  - Collaborate with interdisciplinary team   - Patient/family teaching  - Consider wound care consult   2/4/2024 1138 by Liliane Mccollum RN  Outcome: Progressing  2/4/2024 1138 by Liliane Mccollum RN  Outcome: Progressing

## 2024-02-05 ENCOUNTER — TELEPHONE (OUTPATIENT)
Age: 89
End: 2024-02-05

## 2024-02-05 NOTE — TELEPHONE ENCOUNTER
Called and spoke w/Malka at [facility].  Pt had ORIF R Femur w/Dr Evangelista on 2/1/24.  Today they noticed moderated bright red blood from area where dsg is coming up.  Advised to re-enforce dsg until hear back from Dr Evangelista.  The doctor at [facility] did see pt today and checked her out. VSS, pulse +; color good. Please review and advise further.      They will also CB to schedule PO appt as they need to arrange transportation w/the service they use.

## 2024-02-05 NOTE — TELEPHONE ENCOUNTER
Caller: Malka from [facility]    Doctor: Dr. Evangelista    Reason for call: Malka from [facility] calling stating that the patient just returned and she is having excess bleeding out from underneath the post surgical dressing and the dressing is coming loose.  Malka is asking if they should change the dressing or reinforce the dressing until post op or the post op appt be moved up.  Malka can be reached at the number below.     Call back#: [phone]

## 2024-02-06 ENCOUNTER — TELEPHONE (OUTPATIENT)
Age: 89
End: 2024-02-06

## 2024-02-06 NOTE — TELEPHONE ENCOUNTER
Called and spoke w/Fabiana at [facility] and relayed Dr Evangelista's msg re: dsg changes to her.  She would like these as orders faxed to her.     Please fax to:  ATTN: Fabiana at [facility]  Fax# [phone]    Please fax as I am unable to fax orders.  Thank you.

## 2024-02-06 NOTE — TELEPHONE ENCOUNTER
Caller: Niya Barrow    Doctor: Tonja    Reason for call: Checking on status of fax    Call back#: N/A

## 2024-02-15 ENCOUNTER — APPOINTMENT (OUTPATIENT)
Dept: RADIOLOGY | Facility: AMBULARY SURGERY CENTER | Age: 89
End: 2024-02-15
Attending: STUDENT IN AN ORGANIZED HEALTH CARE EDUCATION/TRAINING PROGRAM
Payer: MEDICARE

## 2024-02-15 ENCOUNTER — OFFICE VISIT (OUTPATIENT)
Dept: OBGYN CLINIC | Facility: CLINIC | Age: 89
End: 2024-02-15

## 2024-02-15 VITALS — HEIGHT: 64 IN | BODY MASS INDEX: 31.54 KG/M2 | WEIGHT: 184.74 LBS

## 2024-02-15 DIAGNOSIS — M97.01XA PERIPROSTHETIC FRACTURE AROUND INTERNAL PROSTHETIC RIGHT HIP JOINT, INITIAL ENCOUNTER (HCC): Primary | ICD-10-CM

## 2024-02-15 DIAGNOSIS — M97.01XA PERIPROSTHETIC FRACTURE AROUND INTERNAL PROSTHETIC RIGHT HIP JOINT, INITIAL ENCOUNTER (HCC): ICD-10-CM

## 2024-02-15 PROCEDURE — 73552 X-RAY EXAM OF FEMUR 2/>: CPT

## 2024-02-15 PROCEDURE — 99024 POSTOP FOLLOW-UP VISIT: CPT | Performed by: STUDENT IN AN ORGANIZED HEALTH CARE EDUCATION/TRAINING PROGRAM

## 2024-02-15 NOTE — PATIENT INSTRUCTIONS
Orthopaedic Surgery - Office Note  Jefry Mcclure (88 y.o. female)   : 1935   MRN: [id number]  Encounter Date: 2/15/2024     No chief complaint on file.     Surgical History         Past Surgical History:   Procedure Laterality Date    ADENOIDECTOMY        HIP SURGERY Bilateral      KNEE SURGERY Bilateral      ORIF FEMUR FRACTURE Right 2024     Procedure: OPEN REDUCTION W/ INTERNAL FIXATION (ORIF) FEMUR, and all associated procedures;  Surgeon: Wilbur Evangelista DO;  [facility]: [facility];  Service: Orthopedics         Assessment / Plan  Status post open reduction internal fixation of right periprosthetic femur fracture, date of surgery 2024     X-rays reviewed and discussed with patient revealing maintained alignment of the patient's main fracture fragment associated with her [place] B1 periprosthetic fracture.  There is no signs of hardware loosening or failure.  The patient's lesser trochanteric fracture fragment has had some interval displacement from the time of surgery it appears to have comminuted at the position of one of the cerclage cables however there is been no signs of subsidence of the implant from operative radiographs.  Pt to be weightbearing as tolerated to right lower extremity  ROM as tolerated to right lower extremity  Discussed with patient to continue working with physical therapy diligently to try to regain ambulatory status and independence  Pt to continue at home analgesic regimen with Tylenol   Pt to follow up in 6 weeks for repeat x-ray and re-evaluation       History of Present Illness  Jefry Mcclure is a 88 y.o. female who presents 2 weeks status post open reduction internal fixation of right periprosthetic proximal femur fracture, date of surgery 2024.  She states she has been doing well and has very minimal pain in the right lower extremity.  She denies any fevers or chills or discharge from the wound.  The patient states that she has been able to get up with a  walker at the rehabilitation facility and ambulate with a walker.  She denies any numbness or paresthesias in right lower extremity.     Review of Systems  Pertinent items are noted in HPI.  All other systems were reviewed and are negative.     Physical Exam  There were no vitals taken for this visit.  Cons:   Appears well.  No apparent distress.  Psych: Alert. Oriented x3.  Mood and affect normal.  Eyes:   PERRLA, EOMI  Resp:   Normal effort.  No audible wheezing or stridor.  CV:      Palpable pulse.  No discernable arrhythmia.    Lymph:  No palpable cervical, axillary, or inguinal lymphadenopathy.  Skin:    Warm.  No palpable masses.  No visible lesions.  Neuro: Normal muscle tone.  Normal and symmetric DTR's.     The right lower extremity was exposed and inspected.  Surgical incisions are intact without erythema drainage or signs of dehiscence.  Staples removed, Steri-Strips placed.  Visible skin intact without erythema, ecchymosis, effusion or obvious osseous deformity.  Patient has mild tenderness to palpation over the lateral aspect of her thigh which is minimal.  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  No pain with internal and external rotation of the hip.  No pain with gentle range of motion of hip and knee.  Sensation intact to superficial peroneal, deep peroneal, sural, saphenous, plantar nerve distributions. Motor intact to extensor hallux longus, tibialis anterior, gastrocnemius muscles, extensor mechanism intact. Limb is well perfused. Brisk capillary refill in all 5 digits. Compartments soft and compressible.      Studies Reviewed  X-ray right femur reveals: maintained alignment of the patient's main fracture fragment associated with her [place] B1 periprosthetic fracture.  There is no signs of hardware  loosening or failure.  The patient's lesser trochanteric fracture fragment has had some interval displacement from the time of surgery it appears to have comminuted at the position of one of the cerclage cables however there is been no signs of subsidence of the implant from operative radiographs.     Procedures        Medical, Surgical, Family, and Social History  The patient's medical history, family history, and social history, were reviewed and updated as appropriate.     Medical History        Past Medical History:   Diagnosis Date    Arthritis                    Family History         Family History   Problem Relation Age of Onset    Rheum arthritis Mother              Social History           Occupational History    Not on file   Tobacco Use    Smoking status: Former    Smokeless tobacco: Never   Vaping Use    Vaping status: Never Used   Substance and Sexual Activity    Alcohol use: Yes    Drug use: Not Currently    Sexual activity: Not on file         No Known Allergies        Current Outpatient Medications:     acetaminophen (TYLENOL) 325 mg tablet, Take 3 tablets (975 mg total) by mouth every 8 (eight) hours as needed for mild pain, Disp: , Rfl:     Calcium Carbonate-Vitamin D 600-200 MG-UNIT TABS, Calcium 600 + D(3) 600 mg-5 mcg (200 unit) tablet  take 1 tablet by mouth twice a day, Disp: , Rfl:     donepezil (ARICEPT) 5 mg tablet, , Disp: , Rfl:     enoxaparin (Lovenox) 40 mg/0.4 mL, Inject 0.4 mL (40 mg total) under the skin in the morning for 28 days, Disp: , Rfl:     gabapentin (NEURONTIN) 100 mg capsule, Take 1 capsule (100 mg total) by mouth daily at bedtime, Disp: , Rfl:     lidocaine (LIDODERM) 5 %, Apply 1 patch topically over 12 hours daily Remove & Discard patch within 12 hours or as directed by MD, Disp: , Rfl:     oxyCODONE (ROXICODONE) 5 immediate release tablet, You may take 2.5 mg (0.5 tab) for moderate pain or 5 mg (1 tab) for severe pain, every 4 hours, as needed., Disp: 20 tablet, Rfl:  0    senna-docusate sodium (SENOKOT S) 8.6-50 mg per tablet, Take 1 tablet by mouth daily at bedtime, Disp: , Rfl:

## 2024-02-15 NOTE — PROGRESS NOTES
Orthopaedic Surgery - Office Note  Jefry Mcclure (88 y.o. female)   : 1935   MRN: [id number]  Encounter Date: 2/15/2024    No chief complaint on file.    Past Surgical History:   Procedure Laterality Date    ADENOIDECTOMY      HIP SURGERY Bilateral     KNEE SURGERY Bilateral     ORIF FEMUR FRACTURE Right 2024    Procedure: OPEN REDUCTION W/ INTERNAL FIXATION (ORIF) FEMUR, and all associated procedures;  Surgeon: Wilbur Evangelista DO;  [facility];  Service: Orthopedics     Assessment / Plan  Status post open reduction internal fixation of right periprosthetic femur fracture, date of surgery 2024    X-rays reviewed and discussed with patient revealing maintained alignment of the patient's main fracture fragment associated with her [place] B1 periprosthetic fracture.  There is no signs of hardware loosening or failure.  The patient's lesser trochanteric fracture fragment has had some interval displacement from the time of surgery it appears to have comminuted at the position of one of the cerclage cables however there is been no signs of subsidence of the implant from operative radiographs.  Pt to be weightbearing as tolerated to right lower extremity  ROM as tolerated to right lower extremity  Discussed with patient to continue working with physical therapy diligently to try to regain ambulatory status and independence  Pt to continue at home analgesic regimen with Tylenol   Pt to follow up in 6 weeks for repeat x-ray and re-evaluation      History of Present Illness  Jefry Mcclure is a 88 y.o. female who presents 2 weeks status post open reduction internal fixation of right periprosthetic proximal femur fracture, date of surgery 2024.  She states she has been doing well and has very minimal pain in the right lower extremity.  She denies any fevers or chills or discharge from the wound.  The patient states that she has been able to get up with a walker at the rehabilitation facility and  ambulate with a walker.  She denies any numbness or paresthesias in right lower extremity.    Review of Systems  Pertinent items are noted in HPI.  All other systems were reviewed and are negative.    Physical Exam  There were no vitals taken for this visit.  Cons: Appears well.  No apparent distress.  Psych: Alert. Oriented x3.  Mood and affect normal.  Eyes: PERRLA, EOMI  Resp: Normal effort.  No audible wheezing or stridor.  CV: Palpable pulse.  No discernable arrhythmia.    Lymph:  No palpable cervical, axillary, or inguinal lymphadenopathy.  Skin: Warm.  No palpable masses.  No visible lesions.  Neuro: Normal muscle tone.  Normal and symmetric DTR's.     The right lower extremity was exposed and inspected.  Surgical incisions are intact without erythema drainage or signs of dehiscence.  Staples removed, Steri-Strips placed.  Visible skin intact without erythema, ecchymosis, effusion or obvious osseous deformity.  Patient has mild tenderness to palpation over the lateral aspect of her thigh which is minimal.  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  No pain with internal and external rotation of the hip.  No pain with gentle range of motion of hip and knee.  Sensation intact to superficial peroneal, deep peroneal, sural, saphenous, plantar nerve distributions. Motor intact to extensor hallux longus, tibialis anterior, gastrocnemius muscles, extensor mechanism intact. Limb is well perfused. Brisk capillary refill in all 5 digits. Compartments soft and compressible.     Studies Reviewed  X-ray right femur reveals: maintained alignment of the patient's main fracture fragment associated with her [place] B1 periprosthetic fracture.  There is no signs of hardware loosening or failure.  The patient's lesser trochanteric  fracture fragment has had some interval displacement from the time of surgery it appears to have comminuted at the position of one of the cerclage cables however there is been no signs of subsidence of the implant from operative radiographs.    Procedures      Medical, Surgical, Family, and Social History  The patient's medical history, family history, and social history, were reviewed and updated as appropriate.    Past Medical History:   Diagnosis Date    Arthritis            Family History   Problem Relation Age of Onset    Rheum arthritis Mother        Social History     Occupational History    Not on file   Tobacco Use    Smoking status: Former    Smokeless tobacco: Never   Vaping Use    Vaping status: Never Used   Substance and Sexual Activity    Alcohol use: Yes    Drug use: Not Currently    Sexual activity: Not on file       No Known Allergies      Current Outpatient Medications:     acetaminophen (TYLENOL) 325 mg tablet, Take 3 tablets (975 mg total) by mouth every 8 (eight) hours as needed for mild pain, Disp: , Rfl:     Calcium Carbonate-Vitamin D 600-200 MG-UNIT TABS, Calcium 600 + D(3) 600 mg-5 mcg (200 unit) tablet  take 1 tablet by mouth twice a day, Disp: , Rfl:     donepezil (ARICEPT) 5 mg tablet, , Disp: , Rfl:     enoxaparin (Lovenox) 40 mg/0.4 mL, Inject 0.4 mL (40 mg total) under the skin in the morning for 28 days, Disp: , Rfl:     gabapentin (NEURONTIN) 100 mg capsule, Take 1 capsule (100 mg total) by mouth daily at bedtime, Disp: , Rfl:     lidocaine (LIDODERM) 5 %, Apply 1 patch topically over 12 hours daily Remove & Discard patch within 12 hours or as directed by MD, Disp: , Rfl:     oxyCODONE (ROXICODONE) 5 immediate release tablet, You may take 2.5 mg (0.5 tab) for moderate pain or 5 mg (1 tab) for severe pain, every 4 hours, as needed., Disp: 20 tablet, Rfl: 0    senna-docusate sodium (SENOKOT S) 8.6-50 mg per tablet, Take 1 tablet by mouth daily at bedtime, Disp: , Rfl:       Amado  DELGADO Ghosh    Scribe Attestation      I,:   am acting as a scribe while in the presence of the attending physician.:       I,:   personally performed the services described in this documentation    as scribed in my presence.:

## 2024-02-21 PROBLEM — N39.0 UTI (URINARY TRACT INFECTION): Status: RESOLVED | Noted: 2024-02-01 | Resolved: 2024-02-21

## 2024-04-01 ENCOUNTER — APPOINTMENT (OUTPATIENT)
Dept: RADIOLOGY | Facility: AMBULARY SURGERY CENTER | Age: 89
End: 2024-04-01
Attending: STUDENT IN AN ORGANIZED HEALTH CARE EDUCATION/TRAINING PROGRAM
Payer: MEDICARE

## 2024-04-01 ENCOUNTER — OFFICE VISIT (OUTPATIENT)
Dept: OBGYN CLINIC | Facility: CLINIC | Age: 89
End: 2024-04-01

## 2024-04-01 VITALS
DIASTOLIC BLOOD PRESSURE: 72 MMHG | HEART RATE: 73 BPM | WEIGHT: 184 LBS | BODY MASS INDEX: 31.41 KG/M2 | SYSTOLIC BLOOD PRESSURE: 127 MMHG | HEIGHT: 64 IN

## 2024-04-01 DIAGNOSIS — M97.01XA PERIPROSTHETIC FRACTURE AROUND INTERNAL PROSTHETIC RIGHT HIP JOINT, INITIAL ENCOUNTER (HCC): ICD-10-CM

## 2024-04-01 DIAGNOSIS — M97.01XA PERIPROSTHETIC FRACTURE AROUND INTERNAL PROSTHETIC RIGHT HIP JOINT, INITIAL ENCOUNTER (HCC): Primary | ICD-10-CM

## 2024-04-01 PROCEDURE — 73552 X-RAY EXAM OF FEMUR 2/>: CPT

## 2024-04-01 PROCEDURE — 99024 POSTOP FOLLOW-UP VISIT: CPT | Performed by: STUDENT IN AN ORGANIZED HEALTH CARE EDUCATION/TRAINING PROGRAM

## 2024-04-01 NOTE — PATIENT INSTRUCTIONS
Orthopaedic Surgery - Office Note  Jefry Mcclure (88 y.o. female)   : 1935   MRN: [id number]  Encounter Date: 2024     No chief complaint on file.     Surgical History         Past Surgical History:   Procedure Laterality Date    ADENOIDECTOMY        HIP SURGERY Bilateral      KNEE SURGERY Bilateral      ORIF FEMUR FRACTURE Right 2024     Procedure: OPEN REDUCTION W/ INTERNAL FIXATION (ORIF) FEMUR, and all associated procedures;  Surgeon: Wilbur Evangelista DO;  [facility];  Service: Orthopedics         Assessment / Plan  Status post open reduction internal fixation of right periprosthetic femur fracture, date of surgery 2024     X-rays reviewed and discussed with patient revealing maintained alignment of the patient's main fracture fragment associated with her [place] B1 periprosthetic fracture.  There is no signs of hardware loosening or failure.  The patient's lesser trochanteric fracture fragment has had some interval displacement from the time of surgery it appears to have comminuted at the position of one of the cerclage cables however there is been no signs of subsidence of the implant from operative radiographs.  Pt to be weightbearing as tolerated to right lower extremity  ROM as tolerated to right lower extremity  Pt to continue at home analgesic regimen with Tylenol   Physical therapy re-ordered for gait training and strength  Pt to follow up in 6 weeks for repeat x-ray and re-evaluation       History of Present Illness  Jefry Mcclure is a 88 y.o. female who presents 2 weeks status post open reduction internal fixation of right periprosthetic proximal femur fracture, date of surgery 2024.  She states she has been doing well and has very minimal pain in the right lower extremity.  She denies any fevers or chills or discharge from the wound.  The patient states that she has been able to get up with a walker at the rehabilitation facility and ambulate with a walker.  She  "denies any numbness or paresthesias in right lower extremity.     Interval history 4/1/2024:  The patient presents about 8 weeks s/p right periprosthetic ORIF, 2/1/2024.  She is doing well and is overall 50% better since injury.  Today she complains of right lateral upper and lateral leg pain she describes as a painful ache. She denies tingling or numbness distally.  She does walk with cane yet can walk without.  She is in wheel chair in office.  She did complete physical therapy at [facility].  She does reside at [facility].  Patient states she has been able to ambulate independently with a walker to her bathroom in her room.  She states that the [facility] discontinued her formal physical therapy.  She would like to continue as she felt like she was improving day by day with continued physical therapy and I agree.  Denies any numbness or paresthesias     Review of Systems  Pertinent items are noted in HPI.  All other systems were reviewed and are negative.     Physical Exam  /72   Pulse 73   Ht 5' 4\" (1.626 m)   Wt 83.5 kg (184 lb)   BMI 31.58 kg/m²   Cons:   Appears well.  No apparent distress.  Psych: Alert. Oriented x3.  Mood and affect normal.  Eyes:   PERRLA, EOMI  Resp:   Normal effort.  No audible wheezing or stridor.  CV:      Palpable pulse.  No discernable arrhythmia.    Lymph:  No palpable cervical, axillary, or inguinal lymphadenopathy.  Skin:    Warm.  No palpable masses.  No visible lesions.  Neuro: Normal muscle tone.  Normal and symmetric DTR's.     The right lower extremity was exposed and inspected.  Surgical incisions are intact without erythema drainage or signs of dehiscence.  Wounds are well-healed and sealed.  Patient has mild tenderness to palpation over the lateral aspect of her thigh which is minimal.  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  No pain with internal and external rotation of the hip.  No pain with gentle range of motion of hip and knee.  Sensation intact to superficial peroneal, deep peroneal, sural, saphenous, plantar nerve distributions. Motor intact to extensor hallux longus, tibialis anterior, gastrocnemius muscles, extensor mechanism intact. Limb is well perfused. Brisk capillary refill in all 5 digits. Compartments soft and compressible.      Studies Reviewed  X-ray right femur reveals: Maintained alignment of the patient's right [place] prosthetic femur fracture.  There is no increased subsidence of the total hip implant.  Fracture lines are becoming less prominent.  No signs of stress reaction distally on the plate.  Procedures        Medical, Surgical, Family, and Social History  The patient's medical history, family history, and social history, were reviewed and updated as appropriate.     Medical History        Past Medical History:   Diagnosis Date    Arthritis                    Family History         Family History   Problem Relation Age of Onset    Rheum arthritis Mother              Social History           Occupational History    Not on file   Tobacco Use    Smoking status: Former    Smokeless tobacco: Never   Vaping Use    Vaping status: Never Used   Substance and Sexual Activity    Alcohol use: Yes    Drug use: Not Currently    Sexual activity: Not on file         No Known Allergies        Current Outpatient Medications:     acetaminophen (TYLENOL) 325 mg tablet, Take 3 tablets (975 mg total) by mouth every 8 (eight) hours as needed for mild pain, Disp: , Rfl:     Calcium Carbonate-Vitamin D 600-200 MG-UNIT TABS, Calcium 600 + D(3) 600 mg-5 mcg (200 unit) tablet  take 1 tablet by mouth twice a day, Disp: , Rfl:     donepezil (ARICEPT) 5 mg tablet, , Disp: , Rfl:     enoxaparin (Lovenox) 40 mg/0.4 mL, Inject 0.4 mL (40 mg total) under the skin in the  morning for 28 days, Disp: , Rfl:     gabapentin (NEURONTIN) 100 mg capsule, Take 1 capsule (100 mg total) by mouth daily at bedtime, Disp: , Rfl:     lidocaine (LIDODERM) 5 %, Apply 1 patch topically over 12 hours daily Remove & Discard patch within 12 hours or as directed by MD, Disp: , Rfl:     oxyCODONE (ROXICODONE) 5 immediate release tablet, You may take 2.5 mg (0.5 tab) for moderate pain or 5 mg (1 tab) for severe pain, every 4 hours, as needed., Disp: 20 tablet, Rfl: 0    senna-docusate sodium (SENOKOT S) 8.6-50 mg per tablet, Take 1 tablet by mouth daily at bedtime, Disp: , Rfl:

## 2024-05-13 ENCOUNTER — OFFICE VISIT (OUTPATIENT)
Dept: OBGYN CLINIC | Facility: CLINIC | Age: 89
End: 2024-05-13
Payer: MEDICARE

## 2024-05-13 ENCOUNTER — APPOINTMENT (OUTPATIENT)
Dept: RADIOLOGY | Facility: AMBULARY SURGERY CENTER | Age: 89
End: 2024-05-13
Attending: STUDENT IN AN ORGANIZED HEALTH CARE EDUCATION/TRAINING PROGRAM
Payer: MEDICARE

## 2024-05-13 VITALS — BODY MASS INDEX: 31.41 KG/M2 | HEIGHT: 64 IN | WEIGHT: 184 LBS

## 2024-05-13 DIAGNOSIS — M97.01XA PERIPROSTHETIC FRACTURE AROUND INTERNAL PROSTHETIC RIGHT HIP JOINT, INITIAL ENCOUNTER (HCC): Primary | ICD-10-CM

## 2024-05-13 DIAGNOSIS — M97.01XA PERIPROSTHETIC FRACTURE AROUND INTERNAL PROSTHETIC RIGHT HIP JOINT, INITIAL ENCOUNTER (HCC): ICD-10-CM

## 2024-05-13 PROCEDURE — 99213 OFFICE O/P EST LOW 20 MIN: CPT | Performed by: STUDENT IN AN ORGANIZED HEALTH CARE EDUCATION/TRAINING PROGRAM

## 2024-05-13 PROCEDURE — 73552 X-RAY EXAM OF FEMUR 2/>: CPT

## 2024-05-13 NOTE — PROGRESS NOTES
Orthopaedic Surgery - Office Note  Jefry Mcclure (88 y.o. female)   : 1935   MRN: [id number]  Encounter Date: 2024    Chief Complaint   Patient presents with    Right Hip - Post-op     Past Surgical History:   Procedure Laterality Date    ADENOIDECTOMY      HIP SURGERY Bilateral     KNEE SURGERY Bilateral     ORIF FEMUR FRACTURE Right 2024    Procedure: OPEN REDUCTION W/ INTERNAL FIXATION (ORIF) FEMUR, and all associated procedures;  Surgeon: Wilbur Evangelista DO;  [facility];  Service: Orthopedics     Assessment / Plan  Status post open reduction internal fixation of right periprosthetic femur fracture, date of surgery 2024    X-rays reviewed and discussed with patient revealing maintained alignment of the patient's main fracture fragment associated with her [place] B1 periprosthetic fracture.  There is no signs of hardware loosening or failure.  no signs of subsidence of the implant from operative radiographs.  Pt to be weightbearing as tolerated to right lower extremity  ROM as tolerated to right lower extremity  Pt to continue at home analgesic regimen with Tylenol   Physical therapy re-ordered for gait training and strength  Pt to follow up in 3 months for repeat x-ray and re-evaluation      History of Present Illness  Jefry Mcclure is a 88 y.o. female who presents 2 weeks status post open reduction internal fixation of right periprosthetic proximal femur fracture, date of surgery 2024.  She states she has been doing well and has very minimal pain in the right lower extremity.  She denies any fevers or chills or discharge from the wound.  The patient states that she has been able to get up with a walker at the rehabilitation facility and ambulate with a walker.  She denies any numbness or paresthesias in right lower extremity.    Interval history 2024:  The patient presents about 8 weeks s/p right periprosthetic ORIF, 2024.  She is doing well and is overall 50% better  "since injury.  Today she complains of right lateral upper and lateral leg pain she describes as a painful ache. She denies tingling or numbness distally.  She does walk with cane yet can walk without.  She is in wheel chair in office.  She did complete physical therapy at [facility].  She does reside at [facility].  Patient states she has been able to ambulate independently with a walker to her bathroom in her room.  She states that the Health system discontinued her formal physical therapy.  She would like to continue as she felt like she was improving day by day with continued physical therapy and I agree.  Denies any numbness or paresthesias    Interval history 5/13/2024  The patient reports she is doing well 3 months status post right periprosthetic femur ORIF. She is going on walks daily and reports minimal pain, except for the lateral aspect of the proximal thigh. She is using a walker. She has completed physical therapy. She is happy with how she feels. She denies new injury or pain     Review of Systems  Pertinent items are noted in HPI.  All other systems were reviewed and are negative.    Physical Exam  Ht 5' 4\" (1.626 m)   Wt 83.5 kg (184 lb)   BMI 31.58 kg/m²   Cons: Appears well.  No apparent distress.  Psych: Alert. Oriented x3.  Mood and affect normal.  Eyes: PERRLA, EOMI  Resp: Normal effort.  No audible wheezing or stridor.  CV: Palpable pulse.  No discernable arrhythmia.    Lymph:  No palpable cervical, axillary, or inguinal lymphadenopathy.  Skin: Warm.  No palpable masses.  No visible lesions.  Neuro: Normal muscle tone.  Normal and symmetric DTR's.     The right lower extremity was exposed and inspected.  Surgical incisions are intact without erythema drainage or signs of dehiscence. Scars are well appearing.  Patient has mild tenderness to palpation over the lateral aspect of her thigh which is minimal.  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  No pain with internal and external rotation of the hip.  No pain with gentle range of motion of hip and knee.  Sensation intact to superficial peroneal, deep peroneal, sural, saphenous, plantar nerve distributions. Motor intact to extensor hallux longus, tibialis anterior, gastrocnemius muscles, extensor mechanism intact. Limb is well perfused. Brisk capillary refill in all 5 digits. Compartments soft and compressible.     Studies Reviewed  X-ray right femur reveals: Maintained alignment of the patient's right periprosthetic femur fracture.  There is no increased subsidence of the total hip implant. There is some resorption of the lesser trochanter  No signs of stress reaction distally on the plate.  Procedures      Medical, Surgical, Family, and Social History  The patient's medical history, family history, and social history, were reviewed and updated as appropriate.    Past Medical History:   Diagnosis Date    Arthritis            Family History   Problem Relation Age of Onset    Rheum arthritis Mother        Social History     Occupational History    Not on file   Tobacco Use    Smoking status: Former    Smokeless tobacco: Never   Vaping Use    Vaping status: Never Used   Substance and Sexual Activity    Alcohol use: Yes    Drug use: Not Currently    Sexual activity: Not on file       No Known Allergies      Current Outpatient Medications:     acetaminophen (TYLENOL) 325 mg tablet, Take 3 tablets (975 mg total) by mouth every 8 (eight) hours as needed for mild pain, Disp: , Rfl:     Calcium Carbonate-Vitamin D 600-200 MG-UNIT TABS, Calcium 600 + D(3) 600 mg-5 mcg (200 unit) tablet  take 1 tablet by mouth twice a day, Disp: , Rfl:     gabapentin (NEURONTIN) 100 mg capsule, Take 1 capsule (100 mg total) by mouth daily at bedtime, Disp: , Rfl:      lidocaine (LIDODERM) 5 %, Apply 1 patch topically over 12 hours daily Remove & Discard patch within 12 hours or as directed by MD, Disp: , Rfl:     oxyCODONE (ROXICODONE) 5 immediate release tablet, You may take 2.5 mg (0.5 tab) for moderate pain or 5 mg (1 tab) for severe pain, every 4 hours, as needed., Disp: 20 tablet, Rfl: 0    senna-docusate sodium (SENOKOT S) 8.6-50 mg per tablet, Take 1 tablet by mouth daily at bedtime, Disp: , Rfl:     donepezil (ARICEPT) 5 mg tablet, , Disp: , Rfl:     enoxaparin (Lovenox) 40 mg/0.4 mL, Inject 0.4 mL (40 mg total) under the skin in the morning for 28 days, Disp: , Rfl:        Dwain Yanez MD    Scribe Attestation      I,:   am acting as a scribe while in the presence of the attending physician.:       I,:   personally performed the services described in this documentation    as scribed in my presence.:

## 2024-05-13 NOTE — PATIENT INSTRUCTIONS
Orthopaedic Surgery - Office Note  Jefry Mcclure (88 y.o. female)   : 1935   MRN: [id number]  Encounter Date: 2024         Chief Complaint   Patient presents with    Right Hip - Post-op      Surgical History         Past Surgical History:   Procedure Laterality Date    ADENOIDECTOMY        HIP SURGERY Bilateral      KNEE SURGERY Bilateral      ORIF FEMUR FRACTURE Right 2024     Procedure: OPEN REDUCTION W/ INTERNAL FIXATION (ORIF) FEMUR, and all associated procedures;  Surgeon: Wilbur Evangelista DO;  [facility];  Service: Orthopedics         Assessment / Plan  Status post open reduction internal fixation of right periprosthetic femur fracture, date of surgery 2024     X-rays reviewed and discussed with patient revealing maintained alignment of the patient's main fracture fragment associated with her [place] B1 periprosthetic fracture.  There is no signs of hardware loosening or failure.  no signs of subsidence of the implant from operative radiographs.  Pt to be weightbearing as tolerated to right lower extremity  ROM as tolerated to right lower extremity  Pt to continue at home analgesic regimen with Tylenol   Physical therapy re-ordered for gait training and strength  Pt to follow up in 3 months for repeat x-ray and re-evaluation       History of Present Illness  Jefry Mcclure is a 88 y.o. female who presents 2 weeks status post open reduction internal fixation of right periprosthetic proximal femur fracture, date of surgery 2024.  She states she has been doing well and has very minimal pain in the right lower extremity.  She denies any fevers or chills or discharge from the wound.  The patient states that she has been able to get up with a walker at the rehabilitation facility and ambulate with a walker.  She denies any numbness or paresthesias in right lower extremity.     Interval history 2024:  The patient presents about 8 weeks s/p right periprosthetic ORIF, 2024.   "She is doing well and is overall 50% better since injury.  Today she complains of right lateral upper and lateral leg pain she describes as a painful ache. She denies tingling or numbness distally.  She does walk with cane yet can walk without.  She is in wheel chair in office.  She did complete physical therapy at [facility].  She does reside at [facility].  Patient states she has been able to ambulate independently with a walker to her bathroom in her room.  She states that the [facility] discontinued her formal physical therapy.  She would like to continue as she felt like she was improving day by day with continued physical therapy and I agree.  Denies any numbness or paresthesias     Interval history 5/13/2024  The patient reports she is doing well 3 months status post right periprosthetic femur ORIF. She is going on walks daily and reports minimal pain, except for the lateral aspect of the proximal thigh. She is using a walker. She has completed physical therapy. She is happy with how she feels. She denies new injury or pain      Review of Systems  Pertinent items are noted in HPI.  All other systems were reviewed and are negative.     Physical Exam  Ht 5' 4\" (1.626 m)   Wt 83.5 kg (184 lb)   BMI 31.58 kg/m²   Cons:   Appears well.  No apparent distress.  Psych: Alert. Oriented x3.  Mood and affect normal.  Eyes:   PERRLA, EOMI  Resp:   Normal effort.  No audible wheezing or stridor.  CV:      Palpable pulse.  No discernable arrhythmia.    Lymph:  No palpable cervical, axillary, or inguinal lymphadenopathy.  Skin:    Warm.  No palpable masses.  No visible lesions.  Neuro: Normal muscle tone.  Normal and symmetric DTR's.     The right lower extremity was exposed and inspected.  Surgical incisions are intact without erythema drainage or signs of dehiscence. Scars are well appearing.  Patient has mild tenderness to palpation over the lateral aspect of her thigh which is minimal.  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  No pain with internal and external rotation of the hip.  No pain with gentle range of motion of hip and knee.  Sensation intact to superficial peroneal, deep peroneal, sural, saphenous, plantar nerve distributions. Motor intact to extensor hallux longus, tibialis anterior, gastrocnemius muscles, extensor mechanism intact. Limb is well perfused. Brisk capillary refill in all 5 digits. Compartments soft and compressible.      Studies Reviewed  X-ray right femur reveals: Maintained alignment of the patient's right periprosthetic femur fracture.  There is no increased subsidence of the total hip implant. There is some resorption of the lesser trochanter  No signs of stress reaction distally on the plate.  Procedures        Medical, Surgical, Family, and Social History  The patient's medical history, family history, and social history, were reviewed and updated as appropriate.     Medical History        Past Medical History:   Diagnosis Date    Arthritis                    Family History         Family History   Problem Relation Age of Onset    Rheum arthritis Mother              Social History           Occupational History    Not on file   Tobacco Use    Smoking status: Former    Smokeless tobacco: Never   Vaping Use    Vaping status: Never Used   Substance and Sexual Activity    Alcohol use: Yes    Drug use: Not Currently    Sexual activity: Not on file         No Known Allergies        Current Outpatient Medications:     acetaminophen (TYLENOL) 325 mg tablet, Take 3 tablets (975 mg total) by mouth every 8 (eight) hours as needed for mild pain, Disp: , Rfl:     Calcium Carbonate-Vitamin D 600-200 MG-UNIT TABS, Calcium 600 + D(3) 600 mg-5 mcg (200 unit) tablet  take 1 tablet by mouth twice a day,  Disp: , Rfl:     gabapentin (NEURONTIN) 100 mg capsule, Take 1 capsule (100 mg total) by mouth daily at bedtime, Disp: , Rfl:     lidocaine (LIDODERM) 5 %, Apply 1 patch topically over 12 hours daily Remove & Discard patch within 12 hours or as directed by MD, Disp: , Rfl:     oxyCODONE (ROXICODONE) 5 immediate release tablet, You may take 2.5 mg (0.5 tab) for moderate pain or 5 mg (1 tab) for severe pain, every 4 hours, as needed., Disp: 20 tablet, Rfl: 0    senna-docusate sodium (SENOKOT S) 8.6-50 mg per tablet, Take 1 tablet by mouth daily at bedtime, Disp: , Rfl:     donepezil (ARICEPT) 5 mg tablet, , Disp: , Rfl:     enoxaparin (Lovenox) 40 mg/0.4 mL, Inject 0.4 mL (40 mg total) under the skin in the morning for 28 days, Disp: , Rfl:

## 2024-08-15 ENCOUNTER — OFFICE VISIT (OUTPATIENT)
Dept: OBGYN CLINIC | Facility: CLINIC | Age: 89
End: 2024-08-15
Payer: MEDICARE

## 2024-08-15 ENCOUNTER — APPOINTMENT (OUTPATIENT)
Dept: RADIOLOGY | Facility: AMBULARY SURGERY CENTER | Age: 89
End: 2024-08-15
Attending: STUDENT IN AN ORGANIZED HEALTH CARE EDUCATION/TRAINING PROGRAM
Payer: MEDICARE

## 2024-08-15 VITALS — HEIGHT: 64 IN | BODY MASS INDEX: 31.41 KG/M2 | WEIGHT: 184 LBS

## 2024-08-15 DIAGNOSIS — M97.01XA PERIPROSTHETIC FRACTURE AROUND INTERNAL PROSTHETIC RIGHT HIP JOINT, INITIAL ENCOUNTER (HCC): ICD-10-CM

## 2024-08-15 DIAGNOSIS — M97.01XA PERIPROSTHETIC FRACTURE AROUND INTERNAL PROSTHETIC RIGHT HIP JOINT, INITIAL ENCOUNTER (HCC): Primary | ICD-10-CM

## 2024-08-15 PROCEDURE — 73552 X-RAY EXAM OF FEMUR 2/>: CPT

## 2024-08-15 PROCEDURE — 99213 OFFICE O/P EST LOW 20 MIN: CPT | Performed by: STUDENT IN AN ORGANIZED HEALTH CARE EDUCATION/TRAINING PROGRAM

## 2024-08-15 NOTE — PATIENT INSTRUCTIONS
Orthopaedic Surgery - Office Note  Jefry Mcclure (89 y.o. adult)   : 1935   MRN: [id number]  Encounter Date: 8/15/2024         Chief Complaint   Patient presents with    Right Hip - Post-op      Surgical History         Past Surgical History:   Procedure Laterality Date    ADENOIDECTOMY        HIP SURGERY Bilateral      KNEE SURGERY Bilateral      ORIF FEMUR FRACTURE Right 2024     Procedure: OPEN REDUCTION W/ INTERNAL FIXATION (ORIF) FEMUR, and all associated procedures;  Surgeon: Wilbur Evangelista DO;  [facility];  Service: Orthopedics         Assessment / Plan  Status post open reduction internal fixation of right periprosthetic femur fracture, date of surgery 2024     X-rays reviewed and discussed with patient revealing maintained alignment of the patient's main fracture fragment associated with her [place] B1 periprosthetic fracture.  There is no signs of hardware loosening or failure.  no signs of subsidence of the implant from operative radiographs.  Pt to be weightbearing as tolerated to right lower extremity  Range of motion as tolerated to right lower extremity  Continue PT for gait training  Activities as tolerated  Follow up in 6 months, x-ray on arrival      History of Present Illness  Jefry Mcclure is a 89 y.o. adult who presents 2 weeks status post open reduction internal fixation of right periprosthetic proximal femur fracture, date of surgery 2024.  She states she has been doing well and has very minimal pain in the right lower extremity.  She denies any fevers or chills or discharge from the wound.  The patient states that she has been able to get up with a walker at the rehabilitation facility and ambulate with a walker.  She denies any numbness or paresthesias in right lower extremity.     Interval history 2024:  The patient presents about 8 weeks s/p right periprosthetic ORIF, 2024.  She is doing well and is overall 50% better since injury.  Today she  "complains of right lateral upper and lateral leg pain she describes as a painful ache. She denies tingling or numbness distally.  She does walk with cane yet can walk without.  She is in wheel chair in office.  She did complete physical therapy at [facility].  She does reside at [facility].  Patient states she has been able to ambulate independently with a walker to her bathroom in her room.  She states that the [facility] discontinued her formal physical therapy.  She would like to continue as she felt like she was improving day by day with continued physical therapy and I agree.  Denies any numbness or paresthesias     Interval history 5/13/2024  The patient reports she is doing well 3 months status post right periprosthetic femur ORIF. She is going on walks daily and reports minimal pain, except for the lateral aspect of the proximal thigh. She is using a walker. She has completed physical therapy. She is happy with how she feels. She denies new injury or pain      Interval history 8/15/2024  Patient presents follow up evaluation 6 months status post right periprosthetic femur ORIF. Patient states she continues to do well, she denies any pain. She has resumed activities as tolerated with out issue or complication. She has completed physical therapy and happy with her outcomes. Denies any acute injury or complication.      Review of Systems  Pertinent items are noted in HPI.  All other systems were reviewed and are negative.     Physical Exam  Ht 5' 4\" (1.626 m)   Wt 83.5 kg (184 lb)   BMI 31.58 kg/m²   Cons:   Appears well.  No apparent distress.  Psych: Alert. Oriented x3.  Mood and affect normal.  Eyes:PERRLA, EOMI  Resp:   Normal effort.  No audible wheezing or stridor.  CV:      Palpable pulse.  No discernable arrhythmia.    Lymph:  No palpable cervical, axillary, or inguinal lymphadenopathy.  Skin:    Warm.  No palpable masses.  No visible lesions.  Neuro: Normal muscle tone.  Normal and " symmetric DTR's.     The right lower extremity was exposed and inspected.  Surgical incisions are intact without erythema drainage or signs of dehiscence. Scars are well appearing.  Patient has mild tenderness to palpation over the lateral aspect of her thigh which is minimal.  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  No pain with internal and external rotation of the hip.  No pain with gentle range of motion of hip and knee.  Sensation intact to superficial peroneal, deep peroneal, sural, saphenous, plantar nerve distributions. Motor intact to extensor hallux longus, tibialis anterior, gastrocnemius muscles, extensor mechanism intact. Limb is well perfused. Brisk capillary refill in all 5 digits. Compartments soft and compressible.      Studies Reviewed  X-ray right femur reveals: Maintained alignment of the patient's right periprosthetic femur fracture.  There is no increased subsidence of the total hip implant. There is some resorption of the lesser trochanter  No signs of stress reaction distally on the plate.           Medical, Surgical, Family, and Social History  The patient's medical history, family history, and social history, were reviewed and updated as appropriate.     Medical History        Past Medical History:   Diagnosis Date    Arthritis                    Family History         Family History   Problem Relation Age of Onset    Rheum arthritis Mother              Social History           Occupational History    Not on file   Tobacco Use    Smoking status: Former    Smokeless tobacco: Never   Vaping Use    Vaping status: Never Used   Substance and Sexual Activity    Alcohol use: Yes    Drug use: Not Currently    Sexual activity: Not on file

## 2024-08-15 NOTE — PROGRESS NOTES
Orthopaedic Surgery - Office Note  Jefry Mcclure (89 y.o. adult)   : 1935   MRN: [id number]  Encounter Date: 8/15/2024    Chief Complaint   Patient presents with    Right Hip - Post-op     Past Surgical History:   Procedure Laterality Date    ADENOIDECTOMY      HIP SURGERY Bilateral     KNEE SURGERY Bilateral     ORIF FEMUR FRACTURE Right 2024    Procedure: OPEN REDUCTION W/ INTERNAL FIXATION (ORIF) FEMUR, and all associated procedures;  Surgeon: Wilbur Evangelista DO;  Location: [facility];  Service: Orthopedics     Assessment / Plan  Status post open reduction internal fixation of right periprosthetic femur fracture, date of surgery 2024    X-rays reviewed and discussed with patient revealing maintained alignment of the patient's main fracture fragment associated with her [place] B1 periprosthetic fracture.  There is no signs of hardware loosening or failure.  no signs of subsidence of the implant from operative radiographs.  Pt to be weightbearing as tolerated to right lower extremity  Range of motion as tolerated to right lower extremity  Continue PT for gait training  Activities as tolerated  Follow up in 6 months, x-ray on arrival     History of Present Illness  Jefry Mcclure is a 89 y.o. adult who presents 2 weeks status post open reduction internal fixation of right periprosthetic proximal femur fracture, date of surgery 2024.  She states she has been doing well and has very minimal pain in the right lower extremity.  She denies any fevers or chills or discharge from the wound.  The patient states that she has been able to get up with a walker at the rehabilitation facility and ambulate with a walker.  She denies any numbness or paresthesias in right lower extremity.    Interval history 2024:  The patient presents about 8 weeks s/p right periprosthetic ORIF, 2024.  She is doing well and is overall 50% better since injury.  Today she complains of right lateral upper and lateral leg  "pain she describes as a painful ache. She denies tingling or numbness distally.  She does walk with cane yet can walk without.  She is in wheel chair in office.  She did complete physical therapy at [facility].  She does reside at [facility].  Patient states she has been able to ambulate independently with a walker to her bathroom in her room.  She states that the [facility] discontinued her formal physical therapy.  She would like to continue as she felt like she was improving day by day with continued physical therapy and I agree.  Denies any numbness or paresthesias    Interval history 5/13/2024  The patient reports she is doing well 3 months status post right periprosthetic femur ORIF. She is going on walks daily and reports minimal pain, except for the lateral aspect of the proximal thigh. She is using a walker. She has completed physical therapy. She is happy with how she feels. She denies new injury or pain     Interval history 8/15/2024  Patient presents follow up evaluation 6 months status post right periprosthetic femur ORIF. Patient states she continues to do well, she denies any pain. She has resumed activities as tolerated with out issue or complication. She has completed physical therapy and happy with her outcomes. Denies any acute injury or complication.     Review of Systems  Pertinent items are noted in HPI.  All other systems were reviewed and are negative.    Physical Exam  Ht 5' 4\" (1.626 m)   Wt 83.5 kg (184 lb)   BMI 31.58 kg/m²   Cons: Appears well.  No apparent distress.  Psych: Alert. Oriented x3.  Mood and affect normal.  Eyes: PERRLA, EOMI  Resp: Normal effort.  No audible wheezing or stridor.  CV: Palpable pulse.  No discernable arrhythmia.    Lymph:  No palpable cervical, axillary, or inguinal lymphadenopathy.  Skin: Warm.  No palpable masses.  No visible lesions.  Neuro: Normal muscle tone.  Normal and symmetric DTR's.     The right lower extremity was exposed and " inspected.  Surgical incisions are intact without erythema drainage or signs of dehiscence. Scars are well appearing.  Patient has mild tenderness to palpation over the lateral aspect of her thigh which is minimal.  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  No pain with internal and external rotation of the hip.  No pain with gentle range of motion of hip and knee.  Sensation intact to superficial peroneal, deep peroneal, sural, saphenous, plantar nerve distributions. Motor intact to extensor hallux longus, tibialis anterior, gastrocnemius muscles, extensor mechanism intact. Limb is well perfused. Brisk capillary refill in all 5 digits. Compartments soft and compressible.     Studies Reviewed  X-ray right femur reveals: Maintained alignment of the patient's right periprosthetic femur fracture.  There is no increased subsidence of the total hip implant. There is some resorption of the lesser trochanter  No signs of stress reaction distally on the plate.        Medical, Surgical, Family, and Social History  The patient's medical history, family history, and social history, were reviewed and updated as appropriate.    Past Medical History:   Diagnosis Date    Arthritis            Family History   Problem Relation Age of Onset    Rheum arthritis Mother        Social History     Occupational History    Not on file   Tobacco Use    Smoking status: Former    Smokeless tobacco: Never   Vaping Use    Vaping status: Never Used   Substance and Sexual Activity    Alcohol use: Yes    Drug use: Not Currently    Sexual activity: Not on file       No Known Allergies      Current Outpatient Medications:     acetaminophen (TYLENOL) 325 mg tablet, Take 3 tablets (975 mg total) by mouth every 8 (eight) hours as needed for mild pain, Disp: ,  Rfl:     Calcium Carbonate-Vitamin D 600-200 MG-UNIT TABS, Calcium 600 + D(3) 600 mg-5 mcg (200 unit) tablet  take 1 tablet by mouth twice a day, Disp: , Rfl:     gabapentin (NEURONTIN) 100 mg capsule, Take 1 capsule (100 mg total) by mouth daily at bedtime, Disp: , Rfl:     lidocaine (LIDODERM) 5 %, Apply 1 patch topically over 12 hours daily Remove & Discard patch within 12 hours or as directed by MD, Disp: , Rfl:     senna-docusate sodium (SENOKOT S) 8.6-50 mg per tablet, Take 1 tablet by mouth daily at bedtime, Disp: , Rfl:     donepezil (ARICEPT) 5 mg tablet, , Disp: , Rfl:     enoxaparin (Lovenox) 40 mg/0.4 mL, Inject 0.4 mL (40 mg total) under the skin in the morning for 28 days, Disp: , Rfl:     oxyCODONE (ROXICODONE) 5 immediate release tablet, You may take 2.5 mg (0.5 tab) for moderate pain or 5 mg (1 tab) for severe pain, every 4 hours, as needed. (Patient not taking: Reported on 8/15/2024), Disp: 20 tablet, Rfl: 0          Scribe Attestation      I,:  Neli Bolden am acting as a scribe while in the presence of the attending physician.:       I,:  Wilbur Evangelista, DO personally performed the services described in this documentation    as scribed in my presence.:

## (undated) DEVICE — ACE WRAP 6 IN UNSTERILE

## (undated) DEVICE — INTENDED FOR TISSUE SEPARATION, AND OTHER PROCEDURES THAT REQUIRE A SHARP SURGICAL BLADE TO PUNCTURE OR CUT.: Brand: BARD-PARKER SAFETY BLADES SIZE 15, STERILE

## (undated) DEVICE — DISPOSABLE OR TOWEL: Brand: CARDINAL HEALTH

## (undated) DEVICE — IMPERVIOUS STOCKINETTE: Brand: DEROYAL

## (undated) DEVICE — CURITY NON-ADHERENT STRIPS: Brand: CURITY

## (undated) DEVICE — 2.8MM DRILL BIT QC 200MM 110MM CALIBRATION

## (undated) DEVICE — ELECTRODE BLADE MOD E-Z CLEAN  2.75IN 7CM -0012AM

## (undated) DEVICE — CHLORAPREP HI-LITE 26ML ORANGE

## (undated) DEVICE — SPONGE SCRUB 4 PCT CHLORHEXIDINE

## (undated) DEVICE — DRAPE C-ARMOUR

## (undated) DEVICE — PENCIL ELECTROSURG E-Z CLEAN -0035H

## (undated) DEVICE — GLOVE SRG BIOGEL 7.5

## (undated) DEVICE — ALCOHOL ISOPROPYL BLUE

## (undated) DEVICE — BETHLEHEM UNIV MAJOR ORTHO,KIT: Brand: CARDINAL HEALTH

## (undated) DEVICE — GLOVE INDICATOR PI UNDERGLOVE SZ 7.5 BLUE

## (undated) DEVICE — 3M™ TEGADERM™ TRANSPARENT FILM DRESSING FRAME STYLE, 1626W, 4 IN X 4-3/4 IN (10 CM X 12 CM), 50/CT 4CT/CASE: Brand: 3M™ TEGADERM™

## (undated) DEVICE — GAUZE SPONGES,16 PLY: Brand: CURITY

## (undated) DEVICE — 3M™ TEGADERM™ TRANSPARENT FILM DRESSING FRAME STYLE, 1624W, 2-3/8 IN X 2-3/4 IN (6 CM X 7 CM), 100/CT 4CT/CASE: Brand: 3M™ TEGADERM™

## (undated) DEVICE — BONE WAX WHITE: Brand: BONE WAX WHITE

## (undated) DEVICE — DRESSING MEPILEX AG BORDER POST-OP 4 X 10 IN

## (undated) DEVICE — 3.2MM THREE-FLUTED DRILL BIT QC/230MM/200MM CALIBRATION

## (undated) DEVICE — 3M™ TEGADERM™ TRANSPARENT FILM DRESSING FRAME STYLE, 1628, 6 IN X 8 IN (15 CM X 20 CM), 10/CT 8CT/CASE: Brand: 3M™ TEGADERM™

## (undated) DEVICE — ABDOMINAL PAD: Brand: DERMACEA

## (undated) DEVICE — SUT PDS II 0 CT-1 27 IN Z340H

## (undated) DEVICE — DRAPE C-ARM X-RAY

## (undated) DEVICE — 4.3MM DRILL BIT/QC/180MM

## (undated) DEVICE — 2.5MM DRILL BIT QC 240MM 150MM CALIBRATION

## (undated) DEVICE — GLOVE INDICATOR PI UNDERGLOVE SZ 8 BLUE

## (undated) DEVICE — PROXIMATE SKIN STAPLERS (35 WIDE) CONTAINS 35 STAINLESS STEEL STAPLES (FIXED HEAD): Brand: PROXIMATE

## (undated) DEVICE — 3M™ TEGADERM™ TRANSPARENT FILM DRESSING FRAME STYLE, 1627, 4 IN X 10 IN (10 CM X 25 CM), 20/CT 4CT/CASE: Brand: 3M™ TEGADERM™

## (undated) DEVICE — HEMOSTATIC MATRIX SURGIFLO 8ML W/THROMBIN

## (undated) DEVICE — HEAVY DUTY TABLE COVER: Brand: CONVERTORS

## (undated) DEVICE — GLOVE SRG BIOGEL 8

## (undated) DEVICE — DRESSING BIOPATCH ANTIMICROBIAL 1 IN DISC

## (undated) DEVICE — SUT MONOCRYL 2-0 SH 27 IN Y417H

## (undated) DEVICE — DRAPE SHEET X-LG